# Patient Record
Sex: FEMALE | Employment: OTHER | URBAN - METROPOLITAN AREA
[De-identification: names, ages, dates, MRNs, and addresses within clinical notes are randomized per-mention and may not be internally consistent; named-entity substitution may affect disease eponyms.]

---

## 2023-10-17 ENCOUNTER — HOSPITAL ENCOUNTER (OUTPATIENT)
Dept: RADIOLOGY | Facility: HOSPITAL | Age: 73
Discharge: HOME | End: 2023-10-17
Payer: MEDICARE

## 2023-10-17 VITALS — HEIGHT: 64 IN | WEIGHT: 151 LBS | BODY MASS INDEX: 25.78 KG/M2

## 2023-10-17 DIAGNOSIS — N60.82 OTHER BENIGN MAMMARY DYSPLASIAS OF LEFT BREAST: ICD-10-CM

## 2023-10-17 PROCEDURE — 77066 DX MAMMO INCL CAD BI: CPT

## 2023-10-21 PROBLEM — S72.143A INTERTROCHANTERIC FRACTURE (MULTI): Status: ACTIVE | Noted: 2021-08-07

## 2023-10-21 PROBLEM — R06.00 DYSPNEA: Status: ACTIVE | Noted: 2023-10-21

## 2023-10-21 PROBLEM — M23.91 INTERNAL DERANGEMENT OF RIGHT KNEE: Status: ACTIVE | Noted: 2018-04-17

## 2023-10-21 PROBLEM — G89.29 CHRONIC PAIN OF RIGHT ANKLE: Status: ACTIVE | Noted: 2021-04-15

## 2023-10-21 PROBLEM — M99.05 SOMATIC DYSFUNCTION OF PELVIS REGION: Status: ACTIVE | Noted: 2021-04-15

## 2023-10-21 PROBLEM — G89.29 CHRONIC BILATERAL LOW BACK PAIN WITHOUT SCIATICA: Status: ACTIVE | Noted: 2021-04-15

## 2023-10-21 PROBLEM — N60.82 OTHER BENIGN MAMMARY DYSPLASIAS OF LEFT BREAST: Status: ACTIVE | Noted: 2023-10-21

## 2023-10-21 PROBLEM — M80.00XD AGE-RELATED OSTEOPOROSIS WITH CURRENT PATHOLOGICAL FRACTURE WITH ROUTINE HEALING: Status: ACTIVE | Noted: 2021-08-10

## 2023-10-21 PROBLEM — M99.02 THORACIC SEGMENT DYSFUNCTION: Status: ACTIVE | Noted: 2021-04-15

## 2023-10-21 PROBLEM — M99.04 SOMATIC DYSFUNCTION OF SACRAL REGION: Status: ACTIVE | Noted: 2021-04-15

## 2023-10-21 PROBLEM — E78.2 MIXED HYPERLIPIDEMIA: Status: ACTIVE | Noted: 2022-05-16

## 2023-10-21 PROBLEM — T88.4XXA HARD TO INTUBATE: Status: ACTIVE | Noted: 2021-08-09

## 2023-10-21 PROBLEM — M25.571 CHRONIC PAIN OF RIGHT ANKLE: Status: ACTIVE | Noted: 2021-04-15

## 2023-10-21 PROBLEM — S72.009A: Status: ACTIVE | Noted: 2021-08-07

## 2023-10-21 PROBLEM — M89.8X1 PAIN IN SCAPULA: Status: ACTIVE | Noted: 2021-04-15

## 2023-10-21 PROBLEM — M99.03 SOMATIC DYSFUNCTION OF LUMBAR REGION: Status: ACTIVE | Noted: 2021-04-15

## 2023-10-21 PROBLEM — R26.2 IMPAIRED AMBULATION: Status: ACTIVE | Noted: 2020-05-16

## 2023-10-21 PROBLEM — I25.10 CAD (CORONARY ARTERY DISEASE): Status: ACTIVE | Noted: 2021-08-07
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
## 2023-10-21 PROBLEM — M54.50 CHRONIC BILATERAL LOW BACK PAIN WITHOUT SCIATICA: Status: ACTIVE | Noted: 2021-04-15

## 2023-10-21 PROBLEM — M99.08 RIB CAGE REGION SOMATIC DYSFUNCTION: Status: ACTIVE | Noted: 2021-04-15

## 2023-10-21 PROBLEM — I10 PRIMARY HYPERTENSION: Status: ACTIVE | Noted: 2022-05-16

## 2023-10-21 PROBLEM — R60.0 BILATERAL LOWER EXTREMITY EDEMA: Status: ACTIVE | Noted: 2022-05-16

## 2023-10-21 RX ORDER — DIVALPROEX SODIUM 500 MG/1
500 TABLET, FILM COATED, EXTENDED RELEASE ORAL DAILY
COMMUNITY

## 2023-10-21 RX ORDER — ROSUVASTATIN CALCIUM 20 MG/1
1 TABLET, COATED ORAL NIGHTLY
COMMUNITY
Start: 2023-05-31 | End: 2024-03-28 | Stop reason: SINTOL

## 2023-10-21 RX ORDER — MONTELUKAST SODIUM 10 MG/1
10 TABLET ORAL EVERY EVENING
COMMUNITY
Start: 2015-02-17 | End: 2023-12-22 | Stop reason: ALTCHOICE

## 2023-10-21 RX ORDER — SPIRONOLACTONE 25 MG/1
25 TABLET ORAL DAILY
COMMUNITY
Start: 2021-11-23 | End: 2024-05-02 | Stop reason: SDUPTHER

## 2023-10-21 RX ORDER — LORATADINE 10 MG/1
10 TABLET ORAL DAILY PRN
COMMUNITY
Start: 2010-05-20

## 2023-10-21 RX ORDER — ROSUVASTATIN CALCIUM 10 MG/1
10 TABLET, COATED ORAL DAILY
COMMUNITY
End: 2023-12-22 | Stop reason: ALTCHOICE

## 2023-10-21 RX ORDER — LIDOCAINE 560 MG/1
PATCH PERCUTANEOUS; TOPICAL; TRANSDERMAL
COMMUNITY

## 2023-10-21 RX ORDER — FLUTICASONE PROPIONATE 50 MCG
1 SPRAY, SUSPENSION (ML) NASAL DAILY
COMMUNITY

## 2023-10-21 RX ORDER — NAPROXEN SODIUM 220 MG/1
81 TABLET, FILM COATED ORAL DAILY
COMMUNITY

## 2023-10-21 RX ORDER — ASCORBIC ACID 500 MG
1000 TABLET ORAL DAILY
COMMUNITY

## 2023-10-21 RX ORDER — ALBUTEROL SULFATE 90 UG/1
1-2 AEROSOL, METERED RESPIRATORY (INHALATION) EVERY 4 HOURS PRN
COMMUNITY
Start: 2012-03-16

## 2023-10-21 RX ORDER — DIVALPROEX SODIUM 500 MG/1
2 TABLET, FILM COATED, EXTENDED RELEASE ORAL NIGHTLY
COMMUNITY
Start: 2015-01-06 | End: 2023-12-22 | Stop reason: SDUPTHER

## 2023-10-21 RX ORDER — NAPROXEN 500 MG/1
500 TABLET ORAL 2 TIMES DAILY PRN
COMMUNITY
Start: 2023-03-20 | End: 2023-12-22 | Stop reason: ALTCHOICE

## 2023-10-24 ENCOUNTER — OFFICE VISIT (OUTPATIENT)
Dept: PRIMARY CARE | Facility: CLINIC | Age: 73
End: 2023-10-24
Payer: MEDICARE

## 2023-10-24 ENCOUNTER — APPOINTMENT (OUTPATIENT)
Dept: PRIMARY CARE | Facility: CLINIC | Age: 73
End: 2023-10-24
Payer: MEDICARE

## 2023-10-24 VITALS
RESPIRATION RATE: 16 BRPM | DIASTOLIC BLOOD PRESSURE: 72 MMHG | WEIGHT: 150 LBS | SYSTOLIC BLOOD PRESSURE: 132 MMHG | BODY MASS INDEX: 25.61 KG/M2 | HEIGHT: 64 IN | HEART RATE: 62 BPM

## 2023-10-24 DIAGNOSIS — I25.118 CORONARY ARTERY DISEASE INVOLVING NATIVE CORONARY ARTERY OF NATIVE HEART WITH OTHER FORM OF ANGINA PECTORIS (CMS-HCC): ICD-10-CM

## 2023-10-24 DIAGNOSIS — K76.0 NONALCOHOLIC HEPATOSTEATOSIS: ICD-10-CM

## 2023-10-24 DIAGNOSIS — E78.2 MIXED HYPERLIPIDEMIA: ICD-10-CM

## 2023-10-24 DIAGNOSIS — I10 PRIMARY HYPERTENSION: ICD-10-CM

## 2023-10-24 DIAGNOSIS — M79.89 LEG SWELLING: ICD-10-CM

## 2023-10-24 DIAGNOSIS — R10.12 LEFT UPPER QUADRANT PAIN: Primary | ICD-10-CM

## 2023-10-24 DIAGNOSIS — J45.909 MODERATE ASTHMA, UNSPECIFIED WHETHER COMPLICATED, UNSPECIFIED WHETHER PERSISTENT (HHS-HCC): ICD-10-CM

## 2023-10-24 DIAGNOSIS — I83.813 VARICOSE VEINS OF BILATERAL LOWER EXTREMITIES WITH PAIN: ICD-10-CM

## 2023-10-24 DIAGNOSIS — R26.89 LOSS OF BALANCE: ICD-10-CM

## 2023-10-24 DIAGNOSIS — R73.9 HYPERGLYCEMIA: ICD-10-CM

## 2023-10-24 DIAGNOSIS — Z00.00 ANNUAL PHYSICAL EXAM: ICD-10-CM

## 2023-10-24 DIAGNOSIS — E03.9 ACQUIRED HYPOTHYROIDISM: ICD-10-CM

## 2023-10-24 PROCEDURE — 99387 INIT PM E/M NEW PAT 65+ YRS: CPT | Performed by: INTERNAL MEDICINE

## 2023-10-24 PROCEDURE — 1036F TOBACCO NON-USER: CPT | Performed by: INTERNAL MEDICINE

## 2023-10-24 PROCEDURE — 1159F MED LIST DOCD IN RCRD: CPT | Performed by: INTERNAL MEDICINE

## 2023-10-24 PROCEDURE — 3075F SYST BP GE 130 - 139MM HG: CPT | Performed by: INTERNAL MEDICINE

## 2023-10-24 PROCEDURE — 99204 OFFICE O/P NEW MOD 45 MIN: CPT | Performed by: INTERNAL MEDICINE

## 2023-10-24 PROCEDURE — 3078F DIAST BP <80 MM HG: CPT | Performed by: INTERNAL MEDICINE

## 2023-10-24 ASSESSMENT — ENCOUNTER SYMPTOMS
PSYCHIATRIC NEGATIVE: 1
HEMATOLOGIC/LYMPHATIC NEGATIVE: 1
ALLERGIC/IMMUNOLOGIC NEGATIVE: 1
RESPIRATORY NEGATIVE: 1
LOSS OF SENSATION IN FEET: 0
MUSCULOSKELETAL NEGATIVE: 1
ENDOCRINE NEGATIVE: 1
ABDOMINAL PAIN: 1
DEPRESSION: 0
NEUROLOGICAL NEGATIVE: 1
EYES NEGATIVE: 1
CONSTITUTIONAL NEGATIVE: 1
OCCASIONAL FEELINGS OF UNSTEADINESS: 0

## 2023-10-24 NOTE — ASSESSMENT & PLAN NOTE
HTN - hypertension well/controlled .Target BP < 130/80  achieved. Educate low salt diet and exercise with weight loss. Educate home self monitoring and diary keeping. Educate risks of elevate blood pressure and benefits of prompt treatment.  Refill

## 2023-10-24 NOTE — ASSESSMENT & PLAN NOTE
CAD-coronary artery disease-patient has a history of myocardial infarction/stent placed in stenosed coronary arteries. Target LDL should be below 70 milligrams per deciliter. Reviewed EKG which shows no significant changes. Follows with his cardiologist/  ???He needs to call us with any new symptoms of angina or shortness of breath. Last stress test was/last coronary catheterization was/. Need to address risk factors BioCore controlling cholesterol blood pressure and diabetes. Educate extensively diet. Patient needs to follow in rehabilitation/mild exercises daily.

## 2023-10-24 NOTE — PROGRESS NOTES
"Subjective   Patient ID: Mckenna Hardin is a 73 y.o. female who presents for New Patient Visit (Left thigh pain).    HPI     Review of Systems   Constitutional: Negative.    HENT: Negative.     Eyes: Negative.    Respiratory: Negative.     Cardiovascular:  Positive for chest pain.   Gastrointestinal:  Positive for abdominal pain.   Endocrine: Negative.    Musculoskeletal: Negative.    Skin: Negative.    Allergic/Immunologic: Negative.    Neurological: Negative.    Hematological: Negative.    Psychiatric/Behavioral: Negative.     All other systems reviewed and are negative.      Objective   Pulse 62   Ht 1.626 m (5' 4\")   Wt 68 kg (150 lb)   BMI 25.75 kg/m²   Pulse 62, height 1.626 m (5' 4\"), weight 68 kg (150 lb).   Physical Exam  Vitals and nursing note reviewed.   Constitutional:       Appearance: Normal appearance.   HENT:      Head: Normocephalic and atraumatic.      Right Ear: Tympanic membrane, ear canal and external ear normal.      Left Ear: Tympanic membrane, ear canal and external ear normal. There is no impacted cerumen.      Nose: Nose normal.      Mouth/Throat:      Mouth: Mucous membranes are moist.      Pharynx: Oropharynx is clear.   Eyes:      Extraocular Movements: Extraocular movements intact.      Conjunctiva/sclera: Conjunctivae normal.      Pupils: Pupils are equal, round, and reactive to light.   Cardiovascular:      Rate and Rhythm: Normal rate and regular rhythm.      Pulses: Normal pulses.      Heart sounds: Normal heart sounds. No murmur heard.  Pulmonary:      Effort: Pulmonary effort is normal. No respiratory distress.      Breath sounds: Normal breath sounds. No stridor. No wheezing, rhonchi or rales.   Chest:      Chest wall: No tenderness.   Abdominal:      General: Abdomen is flat. Bowel sounds are normal. There is no distension.      Palpations: Abdomen is soft. There is no mass.      Tenderness: There is no abdominal tenderness. There is no right CVA tenderness, left CVA " tenderness, guarding or rebound.      Hernia: No hernia is present.   Musculoskeletal:         General: Normal range of motion.      Cervical back: Normal range of motion and neck supple.   Skin:     General: Skin is warm.      Capillary Refill: Capillary refill takes less than 2 seconds.   Neurological:      General: No focal deficit present.      Mental Status: She is alert.      Cranial Nerves: No cranial nerve deficit.      Sensory: No sensory deficit.      Motor: No weakness.      Coordination: Coordination normal.      Gait: Gait normal.      Deep Tendon Reflexes: Reflexes normal.   Psychiatric:         Mood and Affect: Mood normal.         Behavior: Behavior normal. Behavior is cooperative.         Thought Content: Thought content normal.         Judgment: Judgment normal.         Assessment/Plan   Problem List Items Addressed This Visit             ICD-10-CM    CAD (coronary artery disease) I25.10     CAD-coronary artery disease-patient has a history of myocardial infarction/stent placed in stenosed coronary arteries. Target LDL should be below 70 milligrams per deciliter. Reviewed EKG which shows no significant changes. Follows with his cardiologist/  ???He needs to call us with any new symptoms of angina or shortness of breath. Last stress test was/last coronary catheterization was/. Need to address risk factors BioCore controlling cholesterol blood pressure and diabetes. Educate extensively diet. Patient needs to follow in rehabilitation/mild exercises daily.           Relevant Orders    Comprehensive Metabolic Panel    Leg swelling M79.89     Edema - and leg swelling dur to venous insufficiency - responding to low dose Furosemide and recommend: leg elevation and compression stockings -           Mixed hyperlipidemia E78.2     Hypercholesterolemia - Monitor lipid profile and educate patient upon risks of high cholesterol and targets. Educate diet and change in lifestyle and increase in exercises -  Refill: rosuvastatin   and educate compliance with medication and diet.           Relevant Orders    Lipid Panel    Nonalcoholic hepatosteatosis K76.0     Monitor liver function and educate diet Educate extensively low carbohydrate diet AND LOW FAT DIET AND increase in exercise activity   \         Primary hypertension I10     HTN - hypertension well/controlled .Target BP < 130/80  achieved. Educate low salt diet and exercise with weight loss. Educate home self monitoring and diary keeping. Educate risks of elevate blood pressure and benefits of prompt treatment.  Refill           Relevant Orders    Comprehensive Metabolic Panel    Loss of balance R26.89     Loss of balance  - and recommend Physical therapy and strengthening exercises and address gait exercises          Left upper quadrant pain - Primary R10.12     New onset and persistent and recommend CT of the abdomen and pelvis and labs         Relevant Orders    CT abdomen pelvis wo IV contrast    Annual physical exam Z00.00    Varicose veins of bilateral lower extremities with pain I83.813    Hyperglycemia R73.9    Relevant Orders    Hemoglobin A1C    Acquired hypothyroidism E03.9    Relevant Orders    CBC and Auto Differential    TSH with reflex to Free T4 if abnormal

## 2023-10-24 NOTE — ASSESSMENT & PLAN NOTE
Loss of balance  - and recommend Physical therapy and strengthening exercises and address gait exercises

## 2023-10-24 NOTE — ASSESSMENT & PLAN NOTE
Monitor liver function and educate diet Educate extensively low carbohydrate diet AND LOW FAT DIET AND increase in exercise activity   \

## 2023-10-24 NOTE — ASSESSMENT & PLAN NOTE
Hypercholesterolemia - Monitor lipid profile and educate patient upon risks of high cholesterol and targets. Educate diet and change in lifestyle and increase in exercises - Refill: rosuvastatin   and educate compliance with medication and diet.

## 2023-11-08 ENCOUNTER — TELEPHONE (OUTPATIENT)
Dept: PRIMARY CARE | Facility: CLINIC | Age: 73
End: 2023-11-08
Payer: MEDICARE

## 2023-11-08 ENCOUNTER — APPOINTMENT (OUTPATIENT)
Dept: RADIOLOGY | Facility: HOSPITAL | Age: 73
End: 2023-11-08
Payer: MEDICARE

## 2023-11-09 NOTE — TELEPHONE ENCOUNTER
I called and spoke with radiology at [facility]. They suspect that the ct was cancelled yesterday because they have not received approval from insurance for the test yet. I have reached out to the precert department to see what is going on with the patient's approval. I called the patient to inform of the status, no answer, no way to leave a message.

## 2023-11-28 ENCOUNTER — HOSPITAL ENCOUNTER (OUTPATIENT)
Dept: RADIOLOGY | Facility: HOSPITAL | Age: 73
Discharge: HOME | End: 2023-11-28
Payer: MEDICARE

## 2023-11-28 DIAGNOSIS — R10.12 LEFT UPPER QUADRANT PAIN: ICD-10-CM

## 2023-11-28 PROCEDURE — 74176 CT ABD & PELVIS W/O CONTRAST: CPT

## 2023-12-05 ENCOUNTER — TELEPHONE (OUTPATIENT)
Dept: PRIMARY CARE | Facility: CLINIC | Age: 73
End: 2023-12-05
Payer: MEDICARE

## 2023-12-07 ENCOUNTER — OFFICE VISIT (OUTPATIENT)
Dept: PRIMARY CARE | Facility: CLINIC | Age: 73
End: 2023-12-07
Payer: MEDICARE

## 2023-12-07 VITALS
HEART RATE: 72 BPM | DIASTOLIC BLOOD PRESSURE: 70 MMHG | SYSTOLIC BLOOD PRESSURE: 120 MMHG | BODY MASS INDEX: 25.61 KG/M2 | RESPIRATION RATE: 16 BRPM | WEIGHT: 150 LBS | HEIGHT: 64 IN

## 2023-12-07 DIAGNOSIS — K74.69 OTHER CIRRHOSIS OF LIVER (MULTI): Primary | ICD-10-CM

## 2023-12-07 DIAGNOSIS — R73.9 HYPERGLYCEMIA: ICD-10-CM

## 2023-12-07 PROCEDURE — 1159F MED LIST DOCD IN RCRD: CPT | Performed by: INTERNAL MEDICINE

## 2023-12-07 PROCEDURE — 99214 OFFICE O/P EST MOD 30 MIN: CPT | Performed by: INTERNAL MEDICINE

## 2023-12-07 PROCEDURE — 3074F SYST BP LT 130 MM HG: CPT | Performed by: INTERNAL MEDICINE

## 2023-12-07 PROCEDURE — 3078F DIAST BP <80 MM HG: CPT | Performed by: INTERNAL MEDICINE

## 2023-12-07 PROCEDURE — 1036F TOBACCO NON-USER: CPT | Performed by: INTERNAL MEDICINE

## 2023-12-07 ASSESSMENT — ENCOUNTER SYMPTOMS
ENDOCRINE NEGATIVE: 1
GASTROINTESTINAL NEGATIVE: 1
RESPIRATORY NEGATIVE: 1
CONSTITUTIONAL NEGATIVE: 1
EYES NEGATIVE: 1
HEMATOLOGIC/LYMPHATIC NEGATIVE: 1
PSYCHIATRIC NEGATIVE: 1
NEUROLOGICAL NEGATIVE: 1
ALLERGIC/IMMUNOLOGIC NEGATIVE: 1
CARDIOVASCULAR NEGATIVE: 1
MUSCULOSKELETAL NEGATIVE: 1

## 2023-12-07 NOTE — ASSESSMENT & PLAN NOTE
Educate extensively low carbohydrate diet AND LOW FAT DIET AND increase in exercise activity  and weight loss program and monitor HbA1C and glucose levels and consider Metformin 500 mg BID with meals

## 2023-12-07 NOTE — ASSESSMENT & PLAN NOTE
Cirrhosis - of the liver - monitor liver function and monitor Viral hepatitis and no history of ETOH abuse and possible Depalote effect??? Refer to liver specialist /GI

## 2023-12-07 NOTE — PROGRESS NOTES
"Subjective   Patient ID: Mckenna Hardin is a 73 y.o. female who presents for Follow-up (Follow up on CT).    HPI     Review of Systems   Constitutional: Negative.    HENT: Negative.     Eyes: Negative.    Respiratory: Negative.     Cardiovascular: Negative.    Gastrointestinal: Negative.    Endocrine: Negative.    Musculoskeletal: Negative.    Skin: Negative.    Allergic/Immunologic: Negative.    Neurological: Negative.    Hematological: Negative.    Psychiatric/Behavioral: Negative.     All other systems reviewed and are negative.      Objective   Ht 1.626 m (5' 4\")   Wt 68 kg (150 lb)   BMI 25.75 kg/m²   Blood pressure 120/70, pulse 72, resp. rate 16, height 1.626 m (5' 4\"), weight 68 kg (150 lb).   Physical Exam  Vitals and nursing note reviewed.   Constitutional:       Appearance: Normal appearance.   HENT:      Head: Normocephalic and atraumatic.      Right Ear: Tympanic membrane, ear canal and external ear normal.      Left Ear: Tympanic membrane, ear canal and external ear normal. There is no impacted cerumen.      Nose: Nose normal.      Mouth/Throat:      Mouth: Mucous membranes are moist.      Pharynx: Oropharynx is clear.   Eyes:      Extraocular Movements: Extraocular movements intact.      Conjunctiva/sclera: Conjunctivae normal.      Pupils: Pupils are equal, round, and reactive to light.   Cardiovascular:      Rate and Rhythm: Normal rate and regular rhythm.      Pulses: Normal pulses.      Heart sounds: Normal heart sounds. No murmur heard.  Pulmonary:      Effort: Pulmonary effort is normal. No respiratory distress.      Breath sounds: Normal breath sounds. No stridor. No wheezing, rhonchi or rales.   Chest:      Chest wall: No tenderness.   Abdominal:      General: Abdomen is flat. Bowel sounds are normal. There is no distension.      Palpations: Abdomen is soft. There is no mass.      Tenderness: There is no abdominal tenderness. There is no right CVA tenderness, left CVA tenderness, guarding or " rebound.      Hernia: No hernia is present.   Musculoskeletal:         General: Normal range of motion.      Cervical back: Normal range of motion and neck supple.   Skin:     General: Skin is warm.      Capillary Refill: Capillary refill takes less than 2 seconds.   Neurological:      General: No focal deficit present.      Mental Status: She is alert.      Cranial Nerves: No cranial nerve deficit.      Sensory: No sensory deficit.      Motor: No weakness.      Coordination: Coordination normal.      Gait: Gait normal.      Deep Tendon Reflexes: Reflexes normal.   Psychiatric:         Mood and Affect: Mood normal.         Behavior: Behavior normal. Behavior is cooperative.         Thought Content: Thought content normal.         Judgment: Judgment normal.         Assessment/Plan   Problem List Items Addressed This Visit             ICD-10-CM    Hyperglycemia R73.9     Educate extensively low carbohydrate diet AND LOW FAT DIET AND increase in exercise activity  and weight loss program and monitor HbA1C and glucose levels and consider Metformin 500 mg BID with meals             Other cirrhosis of liver (CMS/HCC) - Primary K74.69     Cirrhosis - of the liver - monitor liver function and monitor Viral hepatitis and no history of ETOH abuse and possible Depalote effect??? Refer to liver specialist /GI          Relevant Orders    Hepatitis C antibody    Hepatitis B core antibody, total       CAD (coronary artery disease) I25.10        CAD-coronary artery disease-patient has a history of myocardial infarction/stent placed in stenosed coronary arteries. Target LDL should be below 70 milligrams per deciliter. Reviewed EKG which shows no significant changes. Follows with his cardiologist/  ???He needs to call us with any new symptoms of angina or shortness of breath. Last stress test was/last coronary catheterization was/. Need to address risk factors BioCore controlling cholesterol blood pressure and diabetes. Educate  extensively diet. Patient needs to follow in rehabilitation/mild exercises daily.              Relevant Orders     Comprehensive Metabolic Panel     Leg swelling M79.89       Edema - and leg swelling dur to venous insufficiency - responding to low dose Furosemide and recommend: leg elevation and compression stockings -              Mixed hyperlipidemia E78.2       Hypercholesterolemia - Monitor lipid profile and educate patient upon risks of high cholesterol and targets. Educate diet and change in lifestyle and increase in exercises - Refill: rosuvastatin   and educate compliance with medication and diet.             Relevant Orders     Lipid Panel     Nonalcoholic hepatosteatosis K76.0       Monitor liver function and educate diet Educate extensively low carbohydrate diet AND LOW FAT DIET AND increase in exercise activity   \           Primary hypertension I10       HTN - hypertension well/controlled .Target BP < 130/80  achieved. Educate low salt diet and exercise with weight loss. Educate home self monitoring and diary keeping. Educate risks of elevate blood pressure and benefits of prompt treatment.  Refill              Relevant Orders     Comprehensive Metabolic Panel     Loss of balance R26.89       Loss of balance  - and recommend Physical therapy and strengthening exercises and address gait exercises            Left upper quadrant pain - Primary R10.12       New onset and persistent and recommend CT of the abdomen and pelvis and labs           Relevant Orders     CT abdomen pelvis wo IV contrast     Annual physical exam Z00.00     Varicose veins of bilateral lower extremities with pain I83.813     Hyperglycemia R73.9     Relevant Orders     Hemoglobin A1C     Acquired hypothyroidism E03.9     Relevant Orders     CBC and Auto Differential     TSH with reflex to Free T4 if abnormal

## 2023-12-11 ENCOUNTER — LAB (OUTPATIENT)
Dept: LAB | Facility: LAB | Age: 73
End: 2023-12-11
Payer: MEDICARE

## 2023-12-11 DIAGNOSIS — E03.9 ACQUIRED HYPOTHYROIDISM: ICD-10-CM

## 2023-12-11 DIAGNOSIS — E78.2 MIXED HYPERLIPIDEMIA: ICD-10-CM

## 2023-12-11 DIAGNOSIS — I10 PRIMARY HYPERTENSION: ICD-10-CM

## 2023-12-11 DIAGNOSIS — I25.118 CORONARY ARTERY DISEASE INVOLVING NATIVE CORONARY ARTERY OF NATIVE HEART WITH OTHER FORM OF ANGINA PECTORIS (CMS-HCC): ICD-10-CM

## 2023-12-11 DIAGNOSIS — R73.9 HYPERGLYCEMIA: ICD-10-CM

## 2023-12-11 DIAGNOSIS — K74.69 OTHER CIRRHOSIS OF LIVER (MULTI): ICD-10-CM

## 2023-12-11 LAB
ALBUMIN SERPL BCP-MCNC: 4.4 G/DL (ref 3.4–5)
ALP SERPL-CCNC: 139 U/L (ref 33–136)
ALT SERPL W P-5'-P-CCNC: 26 U/L (ref 7–45)
ANION GAP SERPL CALC-SCNC: 14 MMOL/L (ref 10–20)
AST SERPL W P-5'-P-CCNC: 37 U/L (ref 9–39)
BASOPHILS # BLD AUTO: 0.03 X10*3/UL (ref 0–0.1)
BASOPHILS NFR BLD AUTO: 0.5 %
BILIRUB SERPL-MCNC: 0.8 MG/DL (ref 0–1.2)
BUN SERPL-MCNC: 12 MG/DL (ref 6–23)
CALCIUM SERPL-MCNC: 10.3 MG/DL (ref 8.6–10.6)
CHLORIDE SERPL-SCNC: 106 MMOL/L (ref 98–107)
CHOLEST SERPL-MCNC: 242 MG/DL (ref 0–199)
CHOLESTEROL/HDL RATIO: 4
CO2 SERPL-SCNC: 27 MMOL/L (ref 21–32)
CREAT SERPL-MCNC: 0.74 MG/DL (ref 0.5–1.05)
EOSINOPHIL # BLD AUTO: 0.02 X10*3/UL (ref 0–0.4)
EOSINOPHIL NFR BLD AUTO: 0.3 %
ERYTHROCYTE [DISTWIDTH] IN BLOOD BY AUTOMATED COUNT: 12.4 % (ref 11.5–14.5)
EST. AVERAGE GLUCOSE BLD GHB EST-MCNC: 128 MG/DL
GFR SERPL CREATININE-BSD FRML MDRD: 87 ML/MIN/1.73M*2
GLUCOSE SERPL-MCNC: 104 MG/DL (ref 74–99)
HBA1C MFR BLD: 6.1 %
HBV CORE AB SER QL: NONREACTIVE
HCT VFR BLD AUTO: 48 % (ref 36–46)
HCV AB SER QL: NONREACTIVE
HDLC SERPL-MCNC: 60.1 MG/DL
HGB BLD-MCNC: 15.7 G/DL (ref 12–16)
IMM GRANULOCYTES # BLD AUTO: 0.01 X10*3/UL (ref 0–0.5)
IMM GRANULOCYTES NFR BLD AUTO: 0.2 % (ref 0–0.9)
LDLC SERPL CALC-MCNC: 145 MG/DL
LYMPHOCYTES # BLD AUTO: 3.4 X10*3/UL (ref 0.8–3)
LYMPHOCYTES NFR BLD AUTO: 52.6 %
MCH RBC QN AUTO: 32 PG (ref 26–34)
MCHC RBC AUTO-ENTMCNC: 32.7 G/DL (ref 32–36)
MCV RBC AUTO: 98 FL (ref 80–100)
MONOCYTES # BLD AUTO: 1.02 X10*3/UL (ref 0.05–0.8)
MONOCYTES NFR BLD AUTO: 15.8 %
NEUTROPHILS # BLD AUTO: 1.99 X10*3/UL (ref 1.6–5.5)
NEUTROPHILS NFR BLD AUTO: 30.6 %
NON HDL CHOLESTEROL: 182 MG/DL (ref 0–149)
NRBC BLD-RTO: 0 /100 WBCS (ref 0–0)
PLATELET # BLD AUTO: 171 X10*3/UL (ref 150–450)
POTASSIUM SERPL-SCNC: 4.2 MMOL/L (ref 3.5–5.3)
PROT SERPL-MCNC: 7.1 G/DL (ref 6.4–8.2)
RBC # BLD AUTO: 4.91 X10*6/UL (ref 4–5.2)
SODIUM SERPL-SCNC: 143 MMOL/L (ref 136–145)
T4 FREE SERPL-MCNC: 0.9 NG/DL (ref 0.78–1.48)
TRIGL SERPL-MCNC: 183 MG/DL (ref 0–149)
TSH SERPL-ACNC: 5.9 MIU/L (ref 0.44–3.98)
VLDL: 37 MG/DL (ref 0–40)
WBC # BLD AUTO: 6.5 X10*3/UL (ref 4.4–11.3)

## 2023-12-11 PROCEDURE — 83036 HEMOGLOBIN GLYCOSYLATED A1C: CPT

## 2023-12-11 PROCEDURE — 85025 COMPLETE CBC W/AUTO DIFF WBC: CPT

## 2023-12-11 PROCEDURE — 86803 HEPATITIS C AB TEST: CPT

## 2023-12-11 PROCEDURE — 36415 COLL VENOUS BLD VENIPUNCTURE: CPT

## 2023-12-11 PROCEDURE — 84439 ASSAY OF FREE THYROXINE: CPT

## 2023-12-11 PROCEDURE — 86704 HEP B CORE ANTIBODY TOTAL: CPT

## 2023-12-11 PROCEDURE — 80053 COMPREHEN METABOLIC PANEL: CPT

## 2023-12-11 PROCEDURE — 80061 LIPID PANEL: CPT

## 2023-12-11 PROCEDURE — 84443 ASSAY THYROID STIM HORMONE: CPT

## 2023-12-13 DIAGNOSIS — Z79.899 PHARMACOLOGIC THERAPY: Primary | ICD-10-CM

## 2023-12-14 ENCOUNTER — APPOINTMENT (OUTPATIENT)
Dept: PRIMARY CARE | Facility: CLINIC | Age: 73
End: 2023-12-14
Payer: MEDICARE

## 2023-12-15 ENCOUNTER — APPOINTMENT (OUTPATIENT)
Dept: GASTROENTEROLOGY | Facility: CLINIC | Age: 73
End: 2023-12-15
Payer: MEDICARE

## 2023-12-22 ENCOUNTER — LAB (OUTPATIENT)
Dept: LAB | Facility: LAB | Age: 73
End: 2023-12-22
Payer: MEDICARE

## 2023-12-22 ENCOUNTER — OFFICE VISIT (OUTPATIENT)
Dept: GASTROENTEROLOGY | Facility: CLINIC | Age: 73
End: 2023-12-22
Payer: MEDICARE

## 2023-12-22 VITALS
SYSTOLIC BLOOD PRESSURE: 165 MMHG | DIASTOLIC BLOOD PRESSURE: 72 MMHG | HEIGHT: 64 IN | BODY MASS INDEX: 25.27 KG/M2 | TEMPERATURE: 98.1 F | WEIGHT: 148 LBS | HEART RATE: 65 BPM

## 2023-12-22 DIAGNOSIS — Z79.899 PHARMACOLOGIC THERAPY: ICD-10-CM

## 2023-12-22 DIAGNOSIS — K76.0 NON-ALCOHOLIC FATTY LIVER DISEASE: Primary | ICD-10-CM

## 2023-12-22 DIAGNOSIS — K74.60 CIRRHOSIS OF LIVER WITHOUT ASCITES, UNSPECIFIED HEPATIC CIRRHOSIS TYPE (MULTI): ICD-10-CM

## 2023-12-22 DIAGNOSIS — K76.0 NON-ALCOHOLIC FATTY LIVER DISEASE: ICD-10-CM

## 2023-12-22 LAB
AFP SERPL-MCNC: 5 NG/ML (ref 0–9)
CERULOPLASMIN SERPL-MCNC: 31.1 MG/DL (ref 20–60)
FERRITIN SERPL-MCNC: 430 NG/ML (ref 8–150)
HAV AB SER QL IA: REACTIVE
HBV SURFACE AB SER-ACNC: <3.1 MIU/ML
HBV SURFACE AG SERPL QL IA: NONREACTIVE
INR PPP: 1 (ref 0.9–1.1)
IRON SATN MFR SERPL: 32 % (ref 25–45)
IRON SERPL-MCNC: 132 UG/DL (ref 35–150)
PROTHROMBIN TIME: 11.1 SECONDS (ref 9.8–12.8)
TIBC SERPL-MCNC: 413 UG/DL (ref 240–445)
UIBC SERPL-MCNC: 281 UG/DL (ref 110–370)

## 2023-12-22 PROCEDURE — 36415 COLL VENOUS BLD VENIPUNCTURE: CPT

## 2023-12-22 PROCEDURE — 1159F MED LIST DOCD IN RCRD: CPT | Performed by: INTERNAL MEDICINE

## 2023-12-22 PROCEDURE — 3078F DIAST BP <80 MM HG: CPT | Performed by: INTERNAL MEDICINE

## 2023-12-22 PROCEDURE — 85610 PROTHROMBIN TIME: CPT

## 2023-12-22 PROCEDURE — 86038 ANTINUCLEAR ANTIBODIES: CPT

## 2023-12-22 PROCEDURE — 87340 HEPATITIS B SURFACE AG IA: CPT

## 2023-12-22 PROCEDURE — 82390 ASSAY OF CERULOPLASMIN: CPT

## 2023-12-22 PROCEDURE — 86708 HEPATITIS A ANTIBODY: CPT

## 2023-12-22 PROCEDURE — 3077F SYST BP >= 140 MM HG: CPT | Performed by: INTERNAL MEDICINE

## 2023-12-22 PROCEDURE — 83540 ASSAY OF IRON: CPT

## 2023-12-22 PROCEDURE — 86381 MITOCHONDRIAL ANTIBODY EACH: CPT

## 2023-12-22 PROCEDURE — 82104 ALPHA-1-ANTITRYPSIN PHENO: CPT

## 2023-12-22 PROCEDURE — 99214 OFFICE O/P EST MOD 30 MIN: CPT | Performed by: INTERNAL MEDICINE

## 2023-12-22 PROCEDURE — 99204 OFFICE O/P NEW MOD 45 MIN: CPT | Performed by: INTERNAL MEDICINE

## 2023-12-22 PROCEDURE — 80165 DIPROPYLACETIC ACID FREE: CPT

## 2023-12-22 PROCEDURE — 86706 HEP B SURFACE ANTIBODY: CPT

## 2023-12-22 PROCEDURE — 82728 ASSAY OF FERRITIN: CPT

## 2023-12-22 PROCEDURE — 83550 IRON BINDING TEST: CPT

## 2023-12-22 PROCEDURE — 82105 ALPHA-FETOPROTEIN SERUM: CPT

## 2023-12-22 PROCEDURE — 86015 ACTIN ANTIBODY EACH: CPT

## 2023-12-22 PROCEDURE — 1036F TOBACCO NON-USER: CPT | Performed by: INTERNAL MEDICINE

## 2023-12-22 NOTE — PROGRESS NOTES
HEPATOLOGY NEW PATIENT VISIT    December 22, 2023    Dr. Yogesh Martines       Patient Name:   ANGELIQUE HARDIN  Medical Record Number: [id number]  YOB: 1950    Dear Dr. Martines,    I had the pleasure of seeing Angelique Hardin for consultation in the [facility] ([facility] office). History and physical examination was performed. Pertinent available laboratory, imaging, pathology results were reviewed.     The patient has been followed for many years at [facility].  She has apparently now decided to come to  for her medical care.    History of Present Illness:   The patient is a 73 year old white female who is referred for evaluation of fatty liver disease and concern for cirrhosis.    She established with a new  provider.  Imaging studies suggested possible cirrhosis and she was referred to me for further evaluation.    She has apparently been diagnosed in the past with fatty liver disease.  It is somewhat difficult to get an accurate history from her.  She does not recall what the previous [facility] hepatologist told her.  She is not even sure that they told her that she had fatty liver disease.  She has never had a liver biopsy.  She reports that she did start taking milk thistle on her own.    She has also seen a [facility] hematologist in the past for high blood counts.  They had apparently recommended phlebotomy but she opted not to do it.  I specifically asked whether she had been diagnosed with iron overload and she does not recall that.      The patient has risk factors for fatty liver disease including hyperlipidemia and elevated hemoglobin A1c.  She has been on a statin.  She had a heart attack around 2013 and after that lost 40 pounds and has been able to maintain her weight since then.    The patient denied any past history of acute hepatitis or jaundice.      She has never had any manifestations of liver disease including no jaundice, ascites, hepatic encephalopathy, or variceal  bleeding. She denied ever having a liver biopsy.    She presented today for evaluation.  She denied any specific liver-related complaints.     Past Medical/Surgical History:   COVID  Age-related osteoporosis with current pathological fracture with routine healing  Asthma  Bilateral lower extremity edema  Bipolar I disorder (CMS/HCC)  CAD (coronary artery disease)  Chronic bilateral low back pain without sciatica  Chronic pain of right ankle  Dupuytren's disease of palm  Dyspnea  Fall  Hard to intubate  Hip fx (CMS/HCC)  Impaired ambulation  Internal derangement of right knee  Intertrochanteric fracture (CMS/HCC)  Leg swelling  LFTs abnormal  Lymphedema  Mixed hyperlipidemia  Nevus  Nonalcoholic hepatosteatosis  NSTEMI (non-ST elevated myocardial infarction) (CMS/HCC)  Obesity  Other benign mammary dysplasias of left breast  Pain in scapula  Personal history of other malignant neoplasm of skin  Primary hypertension  Somatic dysfunction of sacral region  Rib cage region somatic dysfunction  Somatic dysfunction of lumbar region  Somatic dysfunction of pelvis region  Thoracic segment dysfunction  Loss of balance  Left upper quadrant pain  Annual physical exam  Varicose veins of bilateral lower extremities with pain  Hyperglycemia  Acquired hypothyroidism  Other cirrhosis of liver (CMS/HCC)      Family History:    Hypertension Mother      Stroke Father      Hypertension Father      Breast Cancer Sister 53         Lef tmastectomy    Diabetes Sister 48         same as breast ca    Heart Brother 57         sudden cardiac death    Diabetes Brother 52         sudden death    Diabetes Paternal Grandmother      Genetic Paternal Grandfather      GI Daughter      Headache Son      Hearing Loss Child      Heart Maternal Aunt      other (cirrhosis [Other]) Paternal Aunt           ?diabete bronze    Stroke Paternal Aunt      Stroke Paternal Aunt       There is no known family history of colon cancer.  Even though the reported family  history shows a paternal aunt with cirrhosis, the patient herself does not recall this.  There is no known family history of hemochromatosis or iron overload that she is aware of.    Social History:   She is a former smoker.  She occasionally drinks alcohol.  She denied transfusions.  She denied tattoos.  She denied intravenous drug use.  She is a Oriental orthodox.  She is .    Review of systems: As noted above.  She has been having issues with significant dizziness.  It is not related to position changes.  She sometimes uses a walker because of the dizziness and unsteady gait.  She has had issues with constipation but no GI bleeding.  No fever, chills, weight loss, visual changes, auditory changes, shortness of breath, chest pain, abdominal pain, GI bleeding, diarrhea, depression, dysuria, hematuria, musculoskeletal issues, or rash.    Medical, Surgical, Family, and Social Histories  Past Medical History:   Diagnosis Date    Atypical ductal hyperplasia, breast     left breast       Past Surgical History:   Procedure Laterality Date    BREAST BIOPSY Left        Family History   Problem Relation Name Age of Onset    Breast cancer Sister         Social History     Socioeconomic History    Marital status:      Spouse name: Not on file    Number of children: Not on file    Years of education: Not on file    Highest education level: Not on file   Occupational History    Not on file   Tobacco Use    Smoking status: Former     Types: Cigarettes     Quit date:      Years since quittin.9    Smokeless tobacco: Never   Substance and Sexual Activity    Alcohol use: Yes     Alcohol/week: 1.0 standard drink of alcohol     Types: 1 Glasses of wine per week     Comment: Social    Drug use: Never    Sexual activity: Not on file   Other Topics Concern    Not on file   Social History Narrative    Not on file     Social Determinants of Health     Financial Resource Strain: Not on file   Food Insecurity: Not on  "file   Transportation Needs: Not on file   Physical Activity: Not on file   Stress: Not on file   Social Connections: Not on file   Intimate Partner Violence: Not on file   Housing Stability: Not on file       Allergies and Current Medications  Allergies   Allergen Reactions    Codeine Unknown     Unable to recall sideffect    Haloperidol Unknown     hyper    Thiazides Rash     Current Outpatient Medications   Medication Sig Dispense Refill    albuterol 90 mcg/actuation inhaler Inhale 1-2 puffs every 4 hours if needed for wheezing.      ascorbic acid (Vitamin C) 500 mg tablet Take 1 tablet (500 mg) by mouth once daily. For 30 days      aspirin 81 mg chewable tablet Chew 1 tablet (81 mg) once daily. For 30 days      divalproex (Depakote ER) 500 mg 24 hr tablet Take 1 tablet (500 mg) by mouth once daily. For 30 days      fluticasone (Flonase) 50 mcg/actuation nasal spray Administer 1 spray into each nostril once daily.      inhalational spacing device inhaler Use as directed with inhalers 1 each 0    lidocaine 4 % patch As directed externally      loratadine (Claritin) 10 mg tablet Take 1 tablet (10 mg) by mouth once daily as needed.      rosuvastatin (Crestor) 20 mg tablet Take 1 tablet (20 mg) by mouth once daily at bedtime.      spironolactone (Aldactone) 25 mg tablet Take 1 tablet (25 mg) by mouth once daily.       No current facility-administered medications for this visit.        Physical Exam  /72 (BP Location: Right arm, Patient Position: Sitting, BP Cuff Size: Adult)   Pulse 65   Temp 36.7 °C (98.1 °F)   Ht 1.626 m (5' 4\")   Wt 67.1 kg (148 lb)   BMI 25.40 kg/m²       Physical Examination:   General Appearance: alert and in no acute distress.   HEENT: oropharynx without lesions. Anicteric sclerae.   Neck supple, nontender, without adenopathy, thyromegaly, or JVD.   Lungs clear to auscultation and percussion.   Heart RRR without murmurs, rubs, or gallops.   Abdomen: Soft, nontender, bowel sounds " positive, without obvious ascites. Liver and spleen not palpable.   Extremities full ROM, no atrophy, normal strength. No edema.   Skin no specific lesions.   Neurological exam nonfocal, alert and oriented.  Blunted affect.  No asterixis.  No spider angiomata, or palmar erythema.     Labs 12/11/2023 HBcAb -, HCV -, HgbA1c 6.1%, TSH 5.9, WBC 6.5, hemoglobin 15.7, platelets 171, glucose 104, sodium 143, creatinine 0.74, protein 7.1, albumin 4.4, alk phos 139, bilirubin 0.8, AST 37, ALT 26, cholesterol 242, , triglycerides 183.    CT without contrast 11/28/2023:  IMPRESSION:  Nodular contour of the liver suggests cirrhosis.          Assessment/Plan:     Fatty liver disease  Concern for cirrhosis    The patient is referred to me for evaluation of fatty liver disease and concern for cirrhosis.    We did speak about fatty liver disease. I explained that the risk factors include diabetes, obesity, hyperlipidemia and alcohol use. I explained that she needs to work on diet, weight loss and exercise.  She also needs to work aggressively with the primary provider about hyperlipidemia and diabetes.  I did explain that 20-25% of patients with NOWAK may proceed to cirrhosis.    Working on the same risk factors could decrease her future risk of heart disease and stroke.    She has been on Depakote for many years for bipolar which could contribute to liver disease as well.    The concern is whether she has cirrhosis based on this latest CAT scan.  Keep in mind that this is a noncontrast CAT scan which is not optimal for surveying the liver.      We will do a standard ultrasound of the liver.      I will stage her liver disease with a FibroSCAN test.  Depending on that result, we can decide whether liver biopsy is warranted.    I will check for immunity against hepatitis A and hepatitis B.  If not immune, then vaccines would be warranted.    With this supposed long history of known liver disease, I will look for other  etiologies of liver disease.  To complete the serologic work-up to evaluate for any viral, autoimmune, or metabolic causes of liver disease, and to evaluate for immunity to hepatitis A and B, I will send off the following lab tests today:   [Antinuclear antibody (JACQUIE)]  [Antimitochondrial antibody (AMA)]  [Smooth muscle antibody (SMA)]  [Ceruloplasmin]  [Iron panel]  [Ferritin]  [Alpha-1-antitrypsin phenotype]  [Hepatitis A total antibody]  [Hepatitis B surface antigen]  [Hepatitis B surface antibody]  [Alpha-fetoprotein (AFP)]    She does have subjective complaints including severe dizziness.  I did recommend that she contact her PCP, or even consider going to urgent care/emergency room if this worsens.  She apparently does have polycythemia and could have hyperviscosity syndrome or other issues.  Obviously, she could have any of the other more common causes of dizziness as well.    She does have a long history of constipation which she reports has gotten worse more recently.  I recommended a colonoscopy.  She is not interested.  I did recommend that she have further discussions with her PCP about this.    Thank you for allowing me to participate in the care of this patient. Please feel free to contact me with any questions regarding their care.     Sincerely,     Jose Martel MD, FAASLD, FACG.   Medical Director, Hepatology  Senior Attending Physician  [facility]  [facility]  Professor of Medicine  Division of Gastroenterology and Liver Disease  [employer]  [address] [place] [id number]  Phone: [phone]  Fax: [phone].      This document was generated with a computerized dictation system.  Because of this, there could be errors in grammar and/or content.

## 2023-12-22 NOTE — PATIENT INSTRUCTIONS
Get lab tests.    Get a standard ultrasound of the liver.    Get a FibroScan ultrasound of the liver.    Speak to your primary care doctor about the dizziness.    Also, speak to your PCP about whether you need a colonoscopy.

## 2023-12-26 LAB
A1AT PHENOTYP SERPL-IMP: NORMAL
A1AT SERPL-MCNC: 105 MG/DL (ref 90–200)
ANA PATTERN: ABNORMAL
ANA SER QL HEP2 SUBST: POSITIVE
ANA TITR SER IF: ABNORMAL {TITER}
MITOCHONDRIA AB SER QL IF: NEGATIVE
SMOOTH MUSCLE AB SER QL IF: NEGATIVE

## 2023-12-27 LAB
SCAN RESULT: NORMAL
VALPROATE FREE SERPL-MCNC: 6.9 UG/ML (ref 4–30)

## 2023-12-28 ENCOUNTER — APPOINTMENT (OUTPATIENT)
Dept: RADIOLOGY | Facility: HOSPITAL | Age: 73
End: 2023-12-28
Payer: MEDICARE

## 2023-12-29 ENCOUNTER — HOSPITAL ENCOUNTER (OUTPATIENT)
Dept: RADIOLOGY | Facility: HOSPITAL | Age: 73
Discharge: HOME | End: 2023-12-29
Payer: MEDICARE

## 2023-12-29 DIAGNOSIS — K74.60 CIRRHOSIS OF LIVER WITHOUT ASCITES, UNSPECIFIED HEPATIC CIRRHOSIS TYPE (MULTI): ICD-10-CM

## 2023-12-29 DIAGNOSIS — K76.0 NON-ALCOHOLIC FATTY LIVER DISEASE: ICD-10-CM

## 2023-12-29 PROCEDURE — 76705 ECHO EXAM OF ABDOMEN: CPT

## 2024-01-10 ENCOUNTER — CLINICAL SUPPORT (OUTPATIENT)
Dept: GASTROENTEROLOGY | Facility: CLINIC | Age: 74
End: 2024-01-10
Payer: MEDICARE

## 2024-01-10 DIAGNOSIS — K74.60 CIRRHOSIS OF LIVER WITHOUT ASCITES, UNSPECIFIED HEPATIC CIRRHOSIS TYPE (MULTI): ICD-10-CM

## 2024-01-10 DIAGNOSIS — K76.0 NON-ALCOHOLIC FATTY LIVER DISEASE: ICD-10-CM

## 2024-01-10 PROCEDURE — 91200 LIVER ELASTOGRAPHY: CPT | Performed by: INTERNAL MEDICINE

## 2024-01-15 ENCOUNTER — TELEPHONE (OUTPATIENT)
Dept: GASTROENTEROLOGY | Facility: CLINIC | Age: 74
End: 2024-01-15
Payer: MEDICARE

## 2024-01-15 NOTE — TELEPHONE ENCOUNTER
Called patient to discuss results and plan per Dr Martel: patient will decide if she wants to proceed with biopsy and call back with decision.     HEPATOLOGY ATTENDING NOTE     I personally reviewed and interpreted the FibroSCAN results.     It revealed a median liver stiffness of 16.5 kPa with an IQR of 12% and   .     This is consistent with stage 4 disease or cirrhosis and moderate   steatosis.     She needs to get a liver biopsy and RTC after the biopsy is done.     LELAND Martel.

## 2024-01-25 ENCOUNTER — TELEPHONE (OUTPATIENT)
Dept: GASTROENTEROLOGY | Facility: CLINIC | Age: 74
End: 2024-01-25
Payer: MEDICARE

## 2024-01-25 DIAGNOSIS — K74.60 CIRRHOSIS OF LIVER WITHOUT ASCITES, UNSPECIFIED HEPATIC CIRRHOSIS TYPE (MULTI): ICD-10-CM

## 2024-01-30 ENCOUNTER — OFFICE VISIT (OUTPATIENT)
Dept: PRIMARY CARE | Facility: CLINIC | Age: 74
End: 2024-01-30
Payer: MEDICARE

## 2024-01-30 ENCOUNTER — TELEPHONE (OUTPATIENT)
Dept: PRIMARY CARE | Facility: CLINIC | Age: 74
End: 2024-01-30

## 2024-01-30 VITALS
HEIGHT: 66 IN | OXYGEN SATURATION: 98 % | RESPIRATION RATE: 16 BRPM | SYSTOLIC BLOOD PRESSURE: 140 MMHG | DIASTOLIC BLOOD PRESSURE: 80 MMHG | BODY MASS INDEX: 23.46 KG/M2 | HEART RATE: 62 BPM | WEIGHT: 146 LBS

## 2024-01-30 DIAGNOSIS — Z00.00 MEDICARE ANNUAL WELLNESS VISIT, SUBSEQUENT: Primary | ICD-10-CM

## 2024-01-30 DIAGNOSIS — K74.69 OTHER CIRRHOSIS OF LIVER (MULTI): ICD-10-CM

## 2024-01-30 DIAGNOSIS — R60.0 BILATERAL LOWER EXTREMITY EDEMA: ICD-10-CM

## 2024-01-30 DIAGNOSIS — R73.01 ELEVATED FASTING GLUCOSE: ICD-10-CM

## 2024-01-30 DIAGNOSIS — I83.813 VARICOSE VEINS OF BILATERAL LOWER EXTREMITIES WITH PAIN: ICD-10-CM

## 2024-01-30 DIAGNOSIS — E03.9 ACQUIRED HYPOTHYROIDISM: ICD-10-CM

## 2024-01-30 DIAGNOSIS — R26.89 LOSS OF BALANCE: ICD-10-CM

## 2024-01-30 DIAGNOSIS — I25.118 CORONARY ARTERY DISEASE INVOLVING NATIVE CORONARY ARTERY OF NATIVE HEART WITH OTHER FORM OF ANGINA PECTORIS (CMS-HCC): ICD-10-CM

## 2024-01-30 DIAGNOSIS — I10 PRIMARY HYPERTENSION: ICD-10-CM

## 2024-01-30 DIAGNOSIS — K76.0 NONALCOHOLIC HEPATOSTEATOSIS: ICD-10-CM

## 2024-01-30 DIAGNOSIS — Z12.11 COLON CANCER SCREENING: ICD-10-CM

## 2024-01-30 DIAGNOSIS — I21.4 NSTEMI (NON-ST ELEVATED MYOCARDIAL INFARCTION) (MULTI): ICD-10-CM

## 2024-01-30 PROCEDURE — 3077F SYST BP >= 140 MM HG: CPT | Performed by: INTERNAL MEDICINE

## 2024-01-30 PROCEDURE — G0439 PPPS, SUBSEQ VISIT: HCPCS | Performed by: INTERNAL MEDICINE

## 2024-01-30 PROCEDURE — 99214 OFFICE O/P EST MOD 30 MIN: CPT | Performed by: INTERNAL MEDICINE

## 2024-01-30 PROCEDURE — 3079F DIAST BP 80-89 MM HG: CPT | Performed by: INTERNAL MEDICINE

## 2024-01-30 PROCEDURE — 1036F TOBACCO NON-USER: CPT | Performed by: INTERNAL MEDICINE

## 2024-01-30 PROCEDURE — 1159F MED LIST DOCD IN RCRD: CPT | Performed by: INTERNAL MEDICINE

## 2024-01-30 RX ORDER — CALCIUM CARBONATE 260MG(650)
TABLET,CHEWABLE ORAL
COMMUNITY

## 2024-01-30 RX ORDER — ASCORBIC ACID 1000 MG
175 TABLET ORAL DAILY
COMMUNITY

## 2024-01-30 RX ORDER — DEXTROSE 4 G
1 TABLET,CHEWABLE ORAL DAILY
Qty: 1 EACH | Refills: 0 | Status: SHIPPED | OUTPATIENT
Start: 2024-01-30

## 2024-01-30 RX ORDER — CHOLECALCIFEROL (VITAMIN D3) 25 MCG
1000 TABLET ORAL DAILY
COMMUNITY

## 2024-01-30 RX ORDER — LANCETS
1 EACH MISCELLANEOUS DAILY
Qty: 100 EACH | Refills: 0 | Status: SHIPPED | OUTPATIENT
Start: 2024-01-30 | End: 2024-02-02 | Stop reason: WASHOUT

## 2024-01-30 RX ORDER — BLOOD SUGAR DIAGNOSTIC
1 STRIP MISCELLANEOUS DAILY
Qty: 100 STRIP | Refills: 0 | Status: SHIPPED | OUTPATIENT
Start: 2024-01-30 | End: 2024-04-29

## 2024-01-30 RX ORDER — LEVOTHYROXINE SODIUM 25 UG/1
25 TABLET ORAL DAILY
Qty: 30 TABLET | Refills: 11 | Status: SHIPPED | OUTPATIENT
Start: 2024-01-30 | End: 2024-05-02 | Stop reason: SDUPTHER

## 2024-01-30 RX ORDER — MILK THISTLE 150 MG
CAPSULE ORAL
COMMUNITY

## 2024-01-30 ASSESSMENT — ENCOUNTER SYMPTOMS
ALLERGIC/IMMUNOLOGIC NEGATIVE: 1
MUSCULOSKELETAL NEGATIVE: 1
EYES NEGATIVE: 1
HEMATOLOGIC/LYMPHATIC NEGATIVE: 1
ENDOCRINE NEGATIVE: 1
PSYCHIATRIC NEGATIVE: 1
NEUROLOGICAL NEGATIVE: 1
RESPIRATORY NEGATIVE: 1
CONSTITUTIONAL NEGATIVE: 1
GASTROINTESTINAL NEGATIVE: 1
CARDIOVASCULAR NEGATIVE: 1

## 2024-01-30 NOTE — ASSESSMENT & PLAN NOTE
Hypothyroidism - Symptoms well/controlled (weight gain, fatigue, constipation, cold intolerance). Check TSH  start low  dose of Synthroid/Levothyroxine  of  25 mcg/qD.

## 2024-01-30 NOTE — ASSESSMENT & PLAN NOTE
No recent hospitalizations.    All medications reviewed and reconciled by me the provider..  No use of controlled substances or opiates.    Family history, social history reviewed, no changes.    Patient does not smoke.    Patient does not drink.    Patient hydrates adequately daily.  Eats a well-balanced healthy diet.     Exercises adequately including walking and doing weightbearing exercises.    Patient denies any difficulty with memory or cognition.     Denies any difficulty with hearing.  Patient does not wear hearing aids.    No fall risk.  No recent falls.  Denies any difficulty walking.    Patient with no history of depression anxiety, denies any loss of interest, no feeling of sadness, no lack of motivation.    Patient is independent in all ADLs and IADLs.  Independent bathing, dressing, walking.  Takes care of own finances, shopping and cooking.     End-of-life decision-making power of  reviewed with patient.      Risk Factors Identified During Visit: None.   Influenza: influenza vaccine was previously given.   Pneumovax 23: Pneumovax 23 vaccine was previously given.   Prevnar 13: Prevnar 13 vaccine was previously given.   Shingles Vaccine: Shingles vaccine was previously given.   Colorectal Cancer Screening: screening is current.   Abdominal Aortic Aneurysm screening: screening is current.   HIV screening: screening not indicated.         Preventive measures - Recommend ASAP : PAP/Mamogram and refer patient to GYN. Specialist. Colonoscopy (educate patient risks of colon cancer) refer patient to GI specialist. Ophthalmology and retina exam recommend yearly exams refer patient to an OphthalmologistBPH - (Benign Prostatic Hypertrophy) refer patient to an Urologist for rectal exam and PSA check.

## 2024-01-30 NOTE — PROGRESS NOTES
"Subjective   Patient ID: Mckenna Hardin is a 73 y.o. female who presents for Follow-up (Follow Up).    HPI     Review of Systems   Constitutional: Negative.    HENT: Negative.     Eyes: Negative.    Respiratory: Negative.     Cardiovascular: Negative.    Gastrointestinal: Negative.    Endocrine: Negative.    Musculoskeletal: Negative.    Skin: Negative.    Allergic/Immunologic: Negative.    Neurological: Negative.    Hematological: Negative.    Psychiatric/Behavioral: Negative.     All other systems reviewed and are negative.      Objective   /70   Pulse 62   Ht 1.676 m (5' 6\")   Wt 66.2 kg (146 lb)   SpO2 98%   BMI 23.57 kg/m²   Blood pressure 140/80, pulse 62, resp. rate 16, height 1.676 m (5' 6\"), weight 66.2 kg (146 lb), SpO2 98 %.   Physical Exam  Vitals and nursing note reviewed.   Constitutional:       Appearance: Normal appearance.   HENT:      Head: Normocephalic and atraumatic.      Right Ear: Tympanic membrane, ear canal and external ear normal.      Left Ear: Tympanic membrane, ear canal and external ear normal. There is no impacted cerumen.      Nose: Nose normal.      Mouth/Throat:      Mouth: Mucous membranes are moist.      Pharynx: Oropharynx is clear.   Eyes:      Extraocular Movements: Extraocular movements intact.      Conjunctiva/sclera: Conjunctivae normal.      Pupils: Pupils are equal, round, and reactive to light.   Cardiovascular:      Rate and Rhythm: Normal rate and regular rhythm.      Pulses: Normal pulses.      Heart sounds: Normal heart sounds. No murmur heard.  Pulmonary:      Effort: Pulmonary effort is normal. No respiratory distress.      Breath sounds: Normal breath sounds. No stridor. No wheezing, rhonchi or rales.   Chest:      Chest wall: No tenderness.   Abdominal:      General: Abdomen is flat. Bowel sounds are normal. There is no distension.      Palpations: Abdomen is soft. There is no mass.      Tenderness: There is no abdominal tenderness. There is no right CVA " tenderness, left CVA tenderness, guarding or rebound.      Hernia: No hernia is present.   Musculoskeletal:         General: Normal range of motion.      Cervical back: Normal range of motion and neck supple.   Skin:     General: Skin is warm.      Capillary Refill: Capillary refill takes less than 2 seconds.   Neurological:      General: No focal deficit present.      Mental Status: She is alert.      Cranial Nerves: No cranial nerve deficit.      Sensory: No sensory deficit.      Motor: No weakness.      Coordination: Coordination normal.      Gait: Gait normal.      Deep Tendon Reflexes: Reflexes normal.   Psychiatric:         Mood and Affect: Mood normal.         Behavior: Behavior normal. Behavior is cooperative.         Thought Content: Thought content normal.         Judgment: Judgment normal.         Assessment/Plan   Problem List Items Addressed This Visit             ICD-10-CM    Bilateral lower extremity edema R60.0    CAD (coronary artery disease) I25.10    Nonalcoholic hepatosteatosis K76.0    NSTEMI (non-ST elevated myocardial infarction) (CMS/[facility]) I21.4    Primary hypertension I10    Loss of balance R26.89    Medicare annual wellness visit, subsequent - Primary Z00.00     No recent hospitalizations.    All medications reviewed and reconciled by me the provider..  No use of controlled substances or opiates.    Family history, social history reviewed, no changes.    Patient does not smoke.    Patient does not drink.    Patient hydrates adequately daily.  Eats a well-balanced healthy diet.     Exercises adequately including walking and doing weightbearing exercises.    Patient denies any difficulty with memory or cognition.     Denies any difficulty with hearing.  Patient does not wear hearing aids.    No fall risk.  No recent falls.  Denies any difficulty walking.    Patient with no history of depression anxiety, denies any loss of interest, no feeling of sadness, no lack of motivation.    Patient is  independent in all ADLs and IADLs.  Independent bathing, dressing, walking.  Takes care of own finances, shopping and cooking.     End-of-life decision-making power of  reviewed with patient.      Risk Factors Identified During Visit: None.   Influenza: influenza vaccine was previously given.   Pneumovax 23: Pneumovax 23 vaccine was previously given.   Prevnar 13: Prevnar 13 vaccine was previously given.   Shingles Vaccine: Shingles vaccine was previously given.   Colorectal Cancer Screening: screening is current.   Abdominal Aortic Aneurysm screening: screening is current.   HIV screening: screening not indicated.       Preventive measures - Recommend ASAP : PAP/Mamogram and refer patient to GYN. Specialist. Colonoscopy (educate patient risks of colon cancer) refer patient to GI specialist. Ophthalmology and retina exam recommend yearly exams refer patient to an OphthalmologistBPH - (Benign Prostatic Hypertrophy) refer patient to an Urologist for rectal exam and PSA check.          Varicose veins of bilateral lower extremities with pain I83.813    Acquired hypothyroidism E03.9     Hypothyroidism - Symptoms well/controlled (weight gain, fatigue, constipation, cold intolerance). Check TSH  start low  dose of Synthroid/Levothyroxine  of  25 mcg/qD.          Relevant Medications    levothyroxine (Synthroid, Levoxyl) 25 mcg tablet    Other cirrhosis of liver (CMS/HCC) K74.69       Hyperglycemia R73.9        Educate extensively low carbohydrate diet AND LOW FAT DIET AND increase in exercise activity  and weight loss program and monitor HbA1C and glucose levels and consider Metformin 500 mg BID with meals                Other cirrhosis of liver (CMS/HCC) - Primary K74.69       Cirrhosis - of the liver - monitor liver function and monitor Viral hepatitis and no history of ETOH abuse and possible Depalote effect??? Refer to liver specialist /GI            Relevant Orders     Hepatitis C antibody     Hepatitis B core  antibody, total                CAD (coronary artery disease) I25.10          CAD-coronary artery disease-patient has a history of myocardial infarction/stent placed in stenosed coronary arteries. Target LDL should be below 70 milligrams per deciliter. Reviewed EKG which shows no significant changes. Follows with his cardiologist/  ???He needs to call us with any new symptoms of angina or shortness of breath. Last stress test was/last coronary catheterization was/. Need to address risk factors BioCore controlling cholesterol blood pressure and diabetes. Educate extensively diet. Patient needs to follow in rehabilitation/mild exercises daily.              Relevant Orders      Comprehensive Metabolic Panel      Leg swelling M79.89        Edema - and leg swelling dur to venous insufficiency - responding to low dose Furosemide and recommend: leg elevation and compression stockings -              Mixed hyperlipidemia E78.2        Hypercholesterolemia - Monitor lipid profile and educate patient upon risks of high cholesterol and targets. Educate diet and change in lifestyle and increase in exercises - Refill: rosuvastatin   and educate compliance with medication and diet.             Relevant Orders      Lipid Panel      Nonalcoholic hepatosteatosis K76.0        Monitor liver function and educate diet Educate extensively low carbohydrate diet AND LOW FAT DIET AND increase in exercise activity   \           Primary hypertension I10        HTN - hypertension well/controlled .Target BP < 130/80  achieved. Educate low salt diet and exercise with weight loss. Educate home self monitoring and diary keeping. Educate risks of elevate blood pressure and benefits of prompt treatment.  Refill              Relevant Orders      Comprehensive Metabolic Panel      Loss of balance R26.89        Loss of balance  - and recommend Physical therapy and strengthening exercises and address gait exercises            Left upper quadrant pain -  Primary R10.12        New onset and persistent and recommend CT of the abdomen and pelvis and labs           Relevant Orders      CT abdomen pelvis wo IV contrast      Annual physical exam Z00.00      Varicose veins of bilateral lower extremities with pain I83.813      Hyperglycemia R73.9      Relevant Orders      Hemoglobin A1C      Acquired hypothyroidism E03.9      Relevant Orders      CBC and Auto Differential      TSH with reflex to Free T4 if abnormal                                       Immunizations/Injections     Flu vaccine (IIV4), preservative free *Check age/dose*12/7/2015  Flu vaccine, quadrivalent, high-dose, preservative free, age 65y+ (FLUZONE)10/5/2022, 10/5/2021, 9/21/2020  Influenza, High Dose Seasonal, Preservative Free8/31/2017  Influenza, Seasonal, Quadrivalent, Viydpvjead87/2/2023  Influenza, Fdommdtdkdd83/12/2012, 10/23/2011  Influenza, seasonal, wapmboddxg39/15/2014, 11/2/2013  Influenza, seasonal, injectable, preservative free9/28/2016  Influenza, trivalent, adjuvanted9/6/2019, 10/3/2018  Moderna COVID-19 vaccine, bivalent, blue cap/gray label *Check age/dose*9/9/2022  Moderna SARS-CoV-2 Vaccination4/11/2022, 11/1/2021, 3/12/2021,  ... (1 more)  Pfizer COVID-19 vaccine, Fall 2023, 12 years and older, (30mcg/0.3mL)10/2/2023  Pneumococcal conjugate vaccine, 13-valent (PREVNAR 13)10/3/2018  Pneumococcal polysaccharide vaccine, 23-valent, age 2 years and older (PNEUMOVAX 23)10/7/2019, 4/7/2010  Tdap vaccine, age 7 year and older (BOOSTRIX, ADACEL)4/7/2010

## 2024-01-31 ENCOUNTER — TELEPHONE (OUTPATIENT)
Dept: PRIMARY CARE | Facility: CLINIC | Age: 74
End: 2024-01-31
Payer: MEDICARE

## 2024-01-31 NOTE — TELEPHONE ENCOUNTER
Per gs-he reviewed pt's blood work with her yesterday at the appointment and blood work from December showed pt to have hypothyroidism. That is why he started Levothyroxin and pt was informed.

## 2024-01-31 NOTE — TELEPHONE ENCOUNTER
Spoke with patient and informed of the TSH levels and why medication was sent to the pharmacy. Pt understands and is informed.

## 2024-02-02 ENCOUNTER — OFFICE VISIT (OUTPATIENT)
Dept: HEMATOLOGY/ONCOLOGY | Facility: CLINIC | Age: 74
End: 2024-02-02
Payer: MEDICARE

## 2024-02-02 VITALS
HEIGHT: 63 IN | RESPIRATION RATE: 18 BRPM | SYSTOLIC BLOOD PRESSURE: 178 MMHG | WEIGHT: 146.72 LBS | DIASTOLIC BLOOD PRESSURE: 84 MMHG | TEMPERATURE: 96.3 F | BODY MASS INDEX: 26 KG/M2 | HEART RATE: 68 BPM | OXYGEN SATURATION: 97 %

## 2024-02-02 DIAGNOSIS — D50.0 IRON DEFICIENCY ANEMIA DUE TO CHRONIC BLOOD LOSS: Primary | ICD-10-CM

## 2024-02-02 LAB
ALBUMIN SERPL BCP-MCNC: 4.6 G/DL (ref 3.4–5)
ALP SERPL-CCNC: 173 U/L (ref 33–136)
ALT SERPL W P-5'-P-CCNC: 40 U/L (ref 7–45)
ANION GAP SERPL CALC-SCNC: 14 MMOL/L (ref 10–20)
AST SERPL W P-5'-P-CCNC: 36 U/L (ref 9–39)
BASOPHILS # BLD AUTO: 0.03 X10*3/UL (ref 0–0.1)
BASOPHILS NFR BLD AUTO: 0.4 %
BILIRUB SERPL-MCNC: 0.7 MG/DL (ref 0–1.2)
BUN SERPL-MCNC: 16 MG/DL (ref 6–23)
CALCIUM SERPL-MCNC: 10 MG/DL (ref 8.6–10.3)
CHLORIDE SERPL-SCNC: 101 MMOL/L (ref 98–107)
CO2 SERPL-SCNC: 28 MMOL/L (ref 21–32)
CREAT SERPL-MCNC: 0.74 MG/DL (ref 0.5–1.05)
EGFRCR SERPLBLD CKD-EPI 2021: 87 ML/MIN/1.73M*2
EOSINOPHIL # BLD AUTO: 0.01 X10*3/UL (ref 0–0.4)
EOSINOPHIL NFR BLD AUTO: 0.1 %
ERYTHROCYTE [DISTWIDTH] IN BLOOD BY AUTOMATED COUNT: 12.1 % (ref 11.5–14.5)
FERRITIN SERPL-MCNC: 383 NG/ML (ref 8–150)
GLUCOSE SERPL-MCNC: 158 MG/DL (ref 74–99)
HCT VFR BLD AUTO: 46.3 % (ref 36–46)
HGB BLD-MCNC: 15.6 G/DL (ref 12–16)
IMM GRANULOCYTES # BLD AUTO: 0.01 X10*3/UL (ref 0–0.5)
IMM GRANULOCYTES NFR BLD AUTO: 0.1 % (ref 0–0.9)
IRON SATN MFR SERPL: 30 % (ref 25–45)
IRON SERPL-MCNC: 135 UG/DL (ref 35–150)
LDH SERPL L TO P-CCNC: 151 U/L (ref 84–246)
LYMPHOCYTES # BLD AUTO: 3.6 X10*3/UL (ref 0.8–3)
LYMPHOCYTES NFR BLD AUTO: 50.8 %
MCH RBC QN AUTO: 31.4 PG (ref 26–34)
MCHC RBC AUTO-ENTMCNC: 33.7 G/DL (ref 32–36)
MCV RBC AUTO: 93 FL (ref 80–100)
MONOCYTES # BLD AUTO: 0.67 X10*3/UL (ref 0.05–0.8)
MONOCYTES NFR BLD AUTO: 9.5 %
NEUTROPHILS # BLD AUTO: 2.76 X10*3/UL (ref 1.6–5.5)
NEUTROPHILS NFR BLD AUTO: 39.1 %
NRBC BLD-RTO: 0 /100 WBCS (ref 0–0)
PLATELET # BLD AUTO: 196 X10*3/UL (ref 150–450)
POTASSIUM SERPL-SCNC: 3.8 MMOL/L (ref 3.5–5.3)
PROT SERPL-MCNC: 7.7 G/DL (ref 6.4–8.2)
RBC # BLD AUTO: 4.97 X10*6/UL (ref 4–5.2)
SODIUM SERPL-SCNC: 139 MMOL/L (ref 136–145)
TIBC SERPL-MCNC: 457 UG/DL (ref 240–445)
UIBC SERPL-MCNC: 322 UG/DL (ref 110–370)
WBC # BLD AUTO: 7.1 X10*3/UL (ref 4.4–11.3)

## 2024-02-02 PROCEDURE — 83615 LACTATE (LD) (LDH) ENZYME: CPT | Performed by: INTERNAL MEDICINE

## 2024-02-02 PROCEDURE — 1125F AMNT PAIN NOTED PAIN PRSNT: CPT | Performed by: INTERNAL MEDICINE

## 2024-02-02 PROCEDURE — 1159F MED LIST DOCD IN RCRD: CPT | Performed by: INTERNAL MEDICINE

## 2024-02-02 PROCEDURE — 83540 ASSAY OF IRON: CPT | Performed by: INTERNAL MEDICINE

## 2024-02-02 PROCEDURE — 3079F DIAST BP 80-89 MM HG: CPT | Performed by: INTERNAL MEDICINE

## 2024-02-02 PROCEDURE — 85025 COMPLETE CBC W/AUTO DIFF WBC: CPT | Performed by: INTERNAL MEDICINE

## 2024-02-02 PROCEDURE — 82728 ASSAY OF FERRITIN: CPT | Performed by: INTERNAL MEDICINE

## 2024-02-02 PROCEDURE — 99204 OFFICE O/P NEW MOD 45 MIN: CPT | Performed by: INTERNAL MEDICINE

## 2024-02-02 PROCEDURE — 1036F TOBACCO NON-USER: CPT | Performed by: INTERNAL MEDICINE

## 2024-02-02 PROCEDURE — 3077F SYST BP >= 140 MM HG: CPT | Performed by: INTERNAL MEDICINE

## 2024-02-02 PROCEDURE — 80053 COMPREHEN METABOLIC PANEL: CPT | Performed by: INTERNAL MEDICINE

## 2024-02-02 PROCEDURE — 99214 OFFICE O/P EST MOD 30 MIN: CPT | Performed by: INTERNAL MEDICINE

## 2024-02-02 RX ORDER — LANCETS 33 GAUGE
EACH MISCELLANEOUS
COMMUNITY
Start: 2024-01-30

## 2024-02-02 RX ORDER — VITAMIN B COMPLEX
1 CAPSULE ORAL DAILY
COMMUNITY

## 2024-02-02 ASSESSMENT — PATIENT HEALTH QUESTIONNAIRE - PHQ9
SUM OF ALL RESPONSES TO PHQ9 QUESTIONS 1 AND 2: 0
1. LITTLE INTEREST OR PLEASURE IN DOING THINGS: NOT AT ALL
2. FEELING DOWN, DEPRESSED OR HOPELESS: NOT AT ALL

## 2024-02-02 ASSESSMENT — ENCOUNTER SYMPTOMS
LOSS OF SENSATION IN FEET: 0
BLOOD IN STOOL: 0
SHORTNESS OF BREATH: 0
DEPRESSION: 0
HOT FLASHES: 0
HEMATURIA: 0
BRUISES/BLEEDS EASILY: 0
FATIGUE: 1
OCCASIONAL FEELINGS OF UNSTEADINESS: 1
SLEEP DISTURBANCE: 0
LEG SWELLING: 1
EYE PROBLEMS: 1
BACK PAIN: 1

## 2024-02-02 ASSESSMENT — COLUMBIA-SUICIDE SEVERITY RATING SCALE - C-SSRS
6. HAVE YOU EVER DONE ANYTHING, STARTED TO DO ANYTHING, OR PREPARED TO DO ANYTHING TO END YOUR LIFE?: NO
1. IN THE PAST MONTH, HAVE YOU WISHED YOU WERE DEAD OR WISHED YOU COULD GO TO SLEEP AND NOT WAKE UP?: NO
2. HAVE YOU ACTUALLY HAD ANY THOUGHTS OF KILLING YOURSELF?: NO

## 2024-02-02 ASSESSMENT — PAIN SCALES - GENERAL: PAINLEVEL: 4

## 2024-02-02 NOTE — PATIENT INSTRUCTIONS
Follow up with Dr. Hurtado in 6 months.     Please feel free to call with any questions or concerns at [phone].

## 2024-02-02 NOTE — PROGRESS NOTES
Patient is being seen today for initial consultation and referred by Dr. Martines, her PCP, for iron overload. She was recently referred to hepatology. She has had multiple scans and they would like to do a liver biopsy.    Limited mobility from hip pain.     Medications reviewed.     Labs drawn today. Follow up in 6 months.

## 2024-02-02 NOTE — PROGRESS NOTES
"Patient ID: Mckenna Hardin is a 73 y.o. female.  Referring Physician: No referring provider defined for this encounter.  Primary Care Provider: Yogesh Martines MD      Subjective    HPI  73-year-old woman with a history of bilateral lower extremity edema, coronary artery disease, with two stents, NSTEMI, nonalcoholic hepatosteatosis, primary hypertension, loss of balance, hypothyroidism, hyperglycemia, varicose veins, elevated alkaline phosphatase,bipolar disorder, referred for lymphocytosis.      Review of Systems   Constitutional:  Positive for fatigue.   HENT:   Positive for hearing loss. Negative for tinnitus.    Eyes:  Positive for eye problems.   Respiratory:  Negative for shortness of breath.    Cardiovascular:  Positive for leg swelling.   Gastrointestinal:  Negative for blood in stool.   Endocrine: Negative for hot flashes.   Genitourinary:  Negative for hematuria.    Musculoskeletal:  Positive for back pain and gait problem.   Skin:  Positive for itching.   Neurological:  Positive for gait problem.   Hematological:  Does not bruise/bleed easily.   Psychiatric/Behavioral:  Negative for depression and sleep disturbance.         Objective   BSA: 1.72 meters squared  /84 (BP Location: Right arm, Patient Position: Sitting, BP Cuff Size: Adult long)   Pulse 68   Temp 35.7 °C (96.3 °F) (Temporal)   Resp 18   Ht 1.597 m (5' 2.87\")   Wt 66.6 kg (146 lb 11.5 oz)   SpO2 97%   BMI 26.10 kg/m²     Family History   Problem Relation Name Age of Onset    Breast cancer Sister       Oncology History    No history exists.       Mckenna Hardin  reports that she quit smoking about 34 years ago. Her smoking use included cigarettes. She has never been exposed to tobacco smoke. She has never used smokeless tobacco.  She  reports that she does not currently use alcohol after a past usage of about 1.0 standard drink of alcohol per week.  She  reports no history of drug use.    Physical Exam  Constitutional:       " Appearance: She is obese.   HENT:      Head: Normocephalic.      Right Ear: External ear normal.      Left Ear: External ear normal.      Nose: Nose normal.      Mouth/Throat:      Mouth: Mucous membranes are moist.      Pharynx: Oropharynx is clear.   Eyes:      Extraocular Movements: Extraocular movements intact.      Pupils: Pupils are equal, round, and reactive to light.   Cardiovascular:      Rate and Rhythm: Normal rate and regular rhythm.      Pulses: Normal pulses.      Heart sounds: No murmur heard.     No friction rub.   Pulmonary:      Effort: Pulmonary effort is normal.      Breath sounds: Normal breath sounds.   Abdominal:      General: Abdomen is flat. Bowel sounds are normal.   Musculoskeletal:         General: Swelling present. Normal range of motion.      Cervical back: Normal range of motion and neck supple.   Skin:     General: Skin is warm.      Coloration: Skin is not jaundiced.   Neurological:      General: No focal deficit present.      Mental Status: She is alert and oriented to person, place, and time.   Psychiatric:         Mood and Affect: Mood normal.         Behavior: Behavior normal.           Assessment/Plan    Summary: 73-year-old woman with a history of bilateral lower extremity edema, coronary artery disease, with two stents, NSTEMI, nonalcoholic hepatosteatosis, primary hypertension, loss of balance, hypothyroidism, hyperglycemia, varicose veins, elevated alkaline phosphatase,bipolar disorder, referred for lymphocytosis.    Lymphocytosis, with monocytosis  12/11/2023: Lymphocytes 3.4K per microliter; monocytes 1.02K per microliter, hematocrit 48%, MCV 98  JACQUIE +1:160  Monocytes 0.95, lymphocytes normal, 5/5/2020  Lymphocytes 4.37, 6/1/2020  Discussion: Patient referred for leukocytosis but her white count is actually normal.  The differential shows a lymphocyte count greater than the upper limit of normal but less than 5000.  She may have a pre-CLL clone  PLAN:  CBC, flow cytometry,  SPEP, LDH  Otherwise return to clinic in 1 month to review the results    Cirrhosis  History of depakote 27 years  CT abdomen pelvis, 11/28/2023 with nodular liver contour  Splenule present  Child Sheets A5, life expectancy 15-20 years  146 lbs  Age 20, 105lbs  FH: aunt with cirrhosis  HBV and HCV negative 12-29-23  Followup with hepatology       Ferritin 430, 12/22/2023  32% iron saturation with TIBC 413  PLAN:   ESR  RTC 3 months    Calin Hurtado MD

## 2024-02-05 ENCOUNTER — DOCUMENTATION (OUTPATIENT)
Dept: HEMATOLOGY/ONCOLOGY | Facility: CLINIC | Age: 74
End: 2024-02-05
Payer: MEDICARE

## 2024-02-05 NOTE — PROGRESS NOTES
Spoke with patient about her labs  TIBC is high, suggesting iron deficiency  Ferritin is high, suggesting iron excess  The patient herself is stable.  No pagophagia.  I gave instructions to the patient to call us if change in symptoms, especially if she has excessive tiredness.

## 2024-02-08 ENCOUNTER — APPOINTMENT (OUTPATIENT)
Dept: HEMATOLOGY/ONCOLOGY | Facility: CLINIC | Age: 74
End: 2024-02-08
Payer: MEDICARE

## 2024-02-12 DIAGNOSIS — Z12.11 COLON CANCER SCREENING: ICD-10-CM

## 2024-02-12 RX ORDER — POLYETHYLENE GLYCOL 3350, SODIUM SULFATE ANHYDROUS, SODIUM BICARBONATE, SODIUM CHLORIDE, POTASSIUM CHLORIDE 236; 22.74; 6.74; 5.86; 2.97 G/4L; G/4L; G/4L; G/4L; G/4L
4000 POWDER, FOR SOLUTION ORAL ONCE
Qty: 4000 ML | Refills: 0 | Status: SHIPPED | OUTPATIENT
Start: 2024-02-12 | End: 2024-02-12

## 2024-02-13 ENCOUNTER — TELEPHONE (OUTPATIENT)
Dept: PRIMARY CARE | Facility: CLINIC | Age: 74
End: 2024-02-13
Payer: MEDICARE

## 2024-02-21 ENCOUNTER — APPOINTMENT (OUTPATIENT)
Dept: GASTROENTEROLOGY | Facility: EXTERNAL LOCATION | Age: 74
End: 2024-02-21
Payer: MEDICARE

## 2024-03-04 ENCOUNTER — TELEPHONE (OUTPATIENT)
Dept: PRIMARY CARE | Facility: CLINIC | Age: 74
End: 2024-03-04
Payer: MEDICARE

## 2024-03-05 DIAGNOSIS — Z12.11 COLON CANCER SCREENING: Primary | ICD-10-CM

## 2024-03-05 RX ORDER — POLYETHYLENE GLYCOL 3350, SODIUM SULFATE ANHYDROUS, SODIUM BICARBONATE, SODIUM CHLORIDE, POTASSIUM CHLORIDE 236; 22.74; 6.74; 5.86; 2.97 G/4L; G/4L; G/4L; G/4L; G/4L
4000 POWDER, FOR SOLUTION ORAL ONCE
Qty: 4000 ML | Refills: 0 | Status: SHIPPED | OUTPATIENT
Start: 2024-03-05 | End: 2024-03-05

## 2024-03-07 ENCOUNTER — APPOINTMENT (OUTPATIENT)
Dept: GASTROENTEROLOGY | Facility: EXTERNAL LOCATION | Age: 74
End: 2024-03-07
Payer: MEDICARE

## 2024-03-19 ENCOUNTER — APPOINTMENT (OUTPATIENT)
Dept: GASTROENTEROLOGY | Facility: CLINIC | Age: 74
End: 2024-03-19
Payer: MEDICARE

## 2024-03-27 ENCOUNTER — TELEPHONE (OUTPATIENT)
Dept: PRIMARY CARE | Facility: CLINIC | Age: 74
End: 2024-03-27
Payer: MEDICARE

## 2024-03-28 ENCOUNTER — OFFICE VISIT (OUTPATIENT)
Dept: PRIMARY CARE | Facility: CLINIC | Age: 74
End: 2024-03-28
Payer: MEDICARE

## 2024-03-28 VITALS
RESPIRATION RATE: 16 BRPM | WEIGHT: 147 LBS | HEART RATE: 72 BPM | SYSTOLIC BLOOD PRESSURE: 120 MMHG | DIASTOLIC BLOOD PRESSURE: 70 MMHG | HEIGHT: 62 IN | BODY MASS INDEX: 27.05 KG/M2

## 2024-03-28 DIAGNOSIS — E03.9 ACQUIRED HYPOTHYROIDISM: ICD-10-CM

## 2024-03-28 DIAGNOSIS — F31.9 BIPOLAR I DISORDER (MULTI): ICD-10-CM

## 2024-03-28 DIAGNOSIS — R60.0 BILATERAL LOWER EXTREMITY EDEMA: ICD-10-CM

## 2024-03-28 DIAGNOSIS — R73.9 HYPERGLYCEMIA: ICD-10-CM

## 2024-03-28 DIAGNOSIS — I10 PRIMARY HYPERTENSION: ICD-10-CM

## 2024-03-28 DIAGNOSIS — J01.00 ACUTE NON-RECURRENT MAXILLARY SINUSITIS: ICD-10-CM

## 2024-03-28 DIAGNOSIS — I25.118 CORONARY ARTERY DISEASE INVOLVING NATIVE CORONARY ARTERY OF NATIVE HEART WITH OTHER FORM OF ANGINA PECTORIS (CMS-HCC): ICD-10-CM

## 2024-03-28 DIAGNOSIS — E78.00 HYPERCHOLESTEROLEMIA: Primary | ICD-10-CM

## 2024-03-28 PROCEDURE — 99214 OFFICE O/P EST MOD 30 MIN: CPT | Performed by: INTERNAL MEDICINE

## 2024-03-28 PROCEDURE — 1036F TOBACCO NON-USER: CPT | Performed by: INTERNAL MEDICINE

## 2024-03-28 PROCEDURE — 3078F DIAST BP <80 MM HG: CPT | Performed by: INTERNAL MEDICINE

## 2024-03-28 PROCEDURE — 1159F MED LIST DOCD IN RCRD: CPT | Performed by: INTERNAL MEDICINE

## 2024-03-28 PROCEDURE — 3074F SYST BP LT 130 MM HG: CPT | Performed by: INTERNAL MEDICINE

## 2024-03-28 RX ORDER — LEVOTHYROXINE SODIUM 25 UG/1
25 TABLET ORAL DAILY
Qty: 30 TABLET | Refills: 2 | Status: SHIPPED | OUTPATIENT
Start: 2024-03-28 | End: 2024-04-23

## 2024-03-28 RX ORDER — ROSUVASTATIN CALCIUM 5 MG/1
5 TABLET, COATED ORAL DAILY
Qty: 100 TABLET | Refills: 3 | Status: SHIPPED | OUTPATIENT
Start: 2024-03-28 | End: 2025-05-02

## 2024-03-28 RX ORDER — FLUTICASONE FUROATE 27.5 UG/1
1 SPRAY, METERED NASAL
Qty: 10 G | Refills: 5 | Status: SHIPPED | OUTPATIENT
Start: 2024-03-28 | End: 2025-03-28

## 2024-03-28 RX ORDER — AZITHROMYCIN 500 MG/1
500 TABLET, FILM COATED ORAL DAILY
Qty: 5 TABLET | Refills: 0 | Status: SHIPPED | OUTPATIENT
Start: 2024-03-28 | End: 2024-04-02

## 2024-03-28 ASSESSMENT — ENCOUNTER SYMPTOMS
RESPIRATORY NEGATIVE: 1
SINUS PRESSURE: 1
HEMATOLOGIC/LYMPHATIC NEGATIVE: 1
MUSCULOSKELETAL NEGATIVE: 1
NEUROLOGICAL NEGATIVE: 1
EYES NEGATIVE: 1
GASTROINTESTINAL NEGATIVE: 1
CONSTITUTIONAL NEGATIVE: 1
ALLERGIC/IMMUNOLOGIC NEGATIVE: 1
PSYCHIATRIC NEGATIVE: 1
ENDOCRINE NEGATIVE: 1
CARDIOVASCULAR NEGATIVE: 1

## 2024-03-28 NOTE — ASSESSMENT & PLAN NOTE
Hypothyroidism - Symptoms not well/controlled (weight gain, fatigue, constipation, cold intolerance). Check TSH  re-start low  dose of Synthroid brand of  25 mcg/qD.

## 2024-03-28 NOTE — PROGRESS NOTES
"Subjective   Patient ID: Mckenna Hardin is a 74 y.o. female who presents for Sinusitis (Sinus congestion-covid negative ). No fever and no chills now she has been sick for 2 weeks now and negative for Covid yesterday     Sinusitis  Associated symptoms include congestion and sinus pressure.        Review of Systems   Constitutional: Negative.    HENT:  Positive for congestion and sinus pressure.    Eyes: Negative.    Respiratory: Negative.     Cardiovascular: Negative.    Gastrointestinal: Negative.    Endocrine: Negative.    Musculoskeletal: Negative.    Skin: Negative.    Allergic/Immunologic: Negative.    Neurological: Negative.    Hematological: Negative.    Psychiatric/Behavioral: Negative.     All other systems reviewed and are negative.      Objective   Ht 1.575 m (5' 2\")   Wt 66.7 kg (147 lb)   BMI 26.89 kg/m²   Blood pressure 120/70, pulse 72, resp. rate 16, height 1.575 m (5' 2\"), weight 66.7 kg (147 lb).   Physical Exam  Vitals and nursing note reviewed.   Constitutional:       Appearance: Normal appearance.   HENT:      Head: Normocephalic and atraumatic.      Right Ear: Tympanic membrane, ear canal and external ear normal.      Left Ear: Tympanic membrane, ear canal and external ear normal. There is no impacted cerumen.      Nose: Nose normal.      Mouth/Throat:      Mouth: Mucous membranes are moist.      Pharynx: Oropharynx is clear.   Eyes:      Extraocular Movements: Extraocular movements intact.      Conjunctiva/sclera: Conjunctivae normal.      Pupils: Pupils are equal, round, and reactive to light.   Cardiovascular:      Rate and Rhythm: Normal rate and regular rhythm.      Pulses: Normal pulses.      Heart sounds: Normal heart sounds. No murmur heard.  Pulmonary:      Effort: Pulmonary effort is normal. No respiratory distress.      Breath sounds: Normal breath sounds. No stridor. No wheezing, rhonchi or rales.   Chest:      Chest wall: No tenderness.   Abdominal:      General: Abdomen is flat. " Bowel sounds are normal. There is no distension.      Palpations: Abdomen is soft. There is no mass.      Tenderness: There is no abdominal tenderness. There is no right CVA tenderness, left CVA tenderness, guarding or rebound.      Hernia: No hernia is present.   Musculoskeletal:         General: Normal range of motion.      Cervical back: Normal range of motion and neck supple.   Skin:     General: Skin is warm.      Capillary Refill: Capillary refill takes less than 2 seconds.   Neurological:      General: No focal deficit present.      Mental Status: She is alert.      Cranial Nerves: No cranial nerve deficit.      Sensory: No sensory deficit.      Motor: No weakness.      Coordination: Coordination normal.      Gait: Gait normal.      Deep Tendon Reflexes: Reflexes normal.   Psychiatric:         Mood and Affect: Mood normal.         Behavior: Behavior normal. Behavior is cooperative.         Thought Content: Thought content normal.         Judgment: Judgment normal.         Assessment/Plan   Problem List Items Addressed This Visit             ICD-10-CM    Bilateral lower extremity edema R60.0    Bipolar I disorder (CMS/HCC) F31.9    CAD (coronary artery disease) I25.10    Primary hypertension I10    Hyperglycemia R73.9    Acquired hypothyroidism E03.9     Hypothyroidism - Symptoms not well/controlled (weight gain, fatigue, constipation, cold intolerance). Check TSH  re-start low  dose of Synthroid brand of  25 mcg/qD.          Relevant Medications    Synthroid 25 mcg tablet    Acute non-recurrent maxillary sinusitis J01.00       Sinusitis - allergic vs. infectious - start Allegra and Flonase I BID and Azithromycin 500 mg qD for 6 days. and avoid cold exposure.           Relevant Medications    azithromycin (Zithromax) 500 mg tablet    fluticasone (Flonase Sensimist) 27.5 mcg/actuation nasal spray    Hypercholesterolemia - Primary E78.00    Relevant Medications    rosuvastatin (Crestor) 5 mg tablet           Varicose veins of bilateral lower extremities with pain I83.813      Acquired hypothyroidism E03.9       Hypothyroidism - Symptoms well/controlled (weight gain, fatigue, constipation, cold intolerance). Check TSH  start low  dose of Synthroid/Levothyroxine  of  25 mcg/qD.            Relevant Medications     levothyroxine (Synthroid, Levoxyl) 25 mcg tablet     Other cirrhosis of liver (CMS/HCC) K74.69                Hyperglycemia R73.9          Educate extensively low carbohydrate diet AND LOW FAT DIET AND increase in exercise activity  and weight loss program and monitor HbA1C and glucose levels and consider Metformin 500 mg BID with meals                Other cirrhosis of liver (CMS/HCC) - Primary K74.69        Cirrhosis - of the liver - monitor liver function and monitor Viral hepatitis and no history of ETOH abuse and possible Depalote effect??? Refer to liver specialist /GI            Relevant Orders      Hepatitis C antibody      Hepatitis B core antibody, total                      CAD (coronary artery disease) I25.10           CAD-coronary artery disease-patient has a history of myocardial infarction/stent placed in stenosed coronary arteries. Target LDL should be below 70 milligrams per deciliter. Reviewed EKG which shows no significant changes. Follows with his cardiologist/  ???He needs to call us with any new symptoms of angina or shortness of breath. Last stress test was/last coronary catheterization was/. Need to address risk factors BioCore controlling cholesterol blood pressure and diabetes. Educate extensively diet. Patient needs to follow in rehabilitation/mild exercises daily.              Relevant Orders       Comprehensive Metabolic Panel       Leg swelling M79.89         Edema - and leg swelling dur to venous insufficiency - responding to low dose Furosemide and recommend: leg elevation and compression stockings -              Mixed hyperlipidemia E78.2         Hypercholesterolemia -  Monitor lipid profile and educate patient upon risks of high cholesterol and targets. Educate diet and change in lifestyle and increase in exercises - Refill: rosuvastatin   and educate compliance with medication and diet.             Relevant Orders       Lipid Panel       Nonalcoholic hepatosteatosis K76.0         Monitor liver function and educate diet Educate extensively low carbohydrate diet AND LOW FAT DIET AND increase in exercise activity   \           Primary hypertension I10         HTN - hypertension well/controlled .Target BP < 130/80  achieved. Educate low salt diet and exercise with weight loss. Educate home self monitoring and diary keeping. Educate risks of elevate blood pressure and benefits of prompt treatment.  Refill              Relevant Orders       Comprehensive Metabolic Panel       Loss of balance R26.89         Loss of balance  - and recommend Physical therapy and strengthening exercises and address gait exercises            Left upper quadrant pain - Primary R10.12         New onset and persistent and recommend CT of the abdomen and pelvis and labs           Relevant Orders       CT abdomen pelvis wo IV contrast       Annual physical exam Z00.00       Varicose veins of bilateral lower extremities with pain I83.813       Hyperglycemia R73.9       Relevant Orders       Hemoglobin A1C       Acquired hypothyroidism E03.9       Relevant Orders       CBC and Auto Differential       TSH with reflex to Free T4 if abnormal                                         Immunizations/Injections      Flu vaccine (IIV4), preservative free *Check age/dose*12/7/2015  Flu vaccine, quadrivalent, high-dose, preservative free, age 65y+ (FLUZONE)10/5/2022, 10/5/2021, 9/21/2020  Influenza, High Dose Seasonal, Preservative Free8/31/2017  Influenza, Seasonal, Quadrivalent, Xizenptbzc36/2/2023  Influenza, Scnrqectlic30/12/2012, 10/23/2011  Influenza, seasonal, ugfuckiylw14/15/2014, 11/2/2013  Influenza, seasonal,  injectable, preservative free9/28/2016  Influenza, trivalent, adjuvanted9/6/2019, 10/3/2018  Moderna COVID-19 vaccine, bivalent, blue cap/gray label *Check age/dose*9/9/2022  Moderna SARS-CoV-2 Vaccination4/11/2022, 11/1/2021, 3/12/2021,  ... (1 more)  Pfizer COVID-19 vaccine, Fall 2023, 12 years and older, (30mcg/0.3mL)10/2/2023  Pneumococcal conjugate vaccine, 13-valent (PREVNAR 13)10/3/2018  Pneumococcal polysaccharide vaccine, 23-valent, age 2 years and older (PNEUMOVAX 23)10/7/2019, 4/7/2010  Tdap vaccine, age 7 year and older (BOOSTRIX, ADACEL)4/7/2010                  Immunizations/Injections     Flu vaccine (IIV4), preservative free *Check age/dose*12/7/2015  Flu vaccine, quadrivalent, high-dose, preservative free, age 65y+ (FLUZONE)10/5/2022, 10/5/2021, 9/21/2020  Influenza, High Dose Seasonal, Preservative Free8/31/2017  Influenza, Seasonal, Quadrivalent, Ujmfwhwebx16/2/2023  Influenza, Fgjcthzyfua72/12/2012, 10/23/2011  Influenza, seasonal, nezazkigqi65/15/2014, 11/2/2013  Influenza, seasonal, injectable, preservative free9/28/2016  Influenza, trivalent, adjuvanted9/6/2019, 10/3/2018  Moderna COVID-19 vaccine, bivalent, blue cap/gray label *Check age/dose*9/9/2022  Moderna SARS-CoV-2 Vaccination4/11/2022, 11/1/2021, 3/12/2021,  ... (1 more)  Pfizer COVID-19 vaccine, Fall 2023, 12 years and older, (30mcg/0.3mL)10/2/2023  Pneumococcal conjugate vaccine, 13-valent (PREVNAR 13)10/3/2018  Pneumococcal polysaccharide vaccine, 23-valent, age 2 years and older (PNEUMOVAX 23)10/7/2019, 4/7/2010  Tdap vaccine, age 7 year and older (BOOSTRIX, ADACEL)

## 2024-03-28 NOTE — ASSESSMENT & PLAN NOTE
Sinusitis - allergic vs. infectious - start Allegra and Flonase I BID and Azithromycin 500 mg qD for 6 days. and avoid cold exposure.

## 2024-04-02 ENCOUNTER — APPOINTMENT (OUTPATIENT)
Dept: PRIMARY CARE | Facility: CLINIC | Age: 74
End: 2024-04-02
Payer: MEDICARE

## 2024-04-09 NOTE — TELEPHONE ENCOUNTER
MRN: [id number] patient called regarding ear infection continues to have pain in right ear after antibiotic please contact

## 2024-04-10 ENCOUNTER — APPOINTMENT (OUTPATIENT)
Dept: GASTROENTEROLOGY | Facility: CLINIC | Age: 74
End: 2024-04-10
Payer: MEDICARE

## 2024-04-10 ENCOUNTER — TELEPHONE (OUTPATIENT)
Dept: PRIMARY CARE | Facility: CLINIC | Age: 74
End: 2024-04-10

## 2024-04-16 ENCOUNTER — APPOINTMENT (OUTPATIENT)
Dept: GASTROENTEROLOGY | Facility: CLINIC | Age: 74
End: 2024-04-16
Payer: MEDICARE

## 2024-04-19 ENCOUNTER — APPOINTMENT (OUTPATIENT)
Dept: GASTROENTEROLOGY | Facility: EXTERNAL LOCATION | Age: 74
End: 2024-04-19
Payer: MEDICARE

## 2024-04-23 DIAGNOSIS — E03.9 ACQUIRED HYPOTHYROIDISM: ICD-10-CM

## 2024-04-23 RX ORDER — LEVOTHYROXINE SODIUM 25 UG/1
25 TABLET ORAL DAILY
Qty: 90 TABLET | Refills: 0 | Status: SHIPPED | OUTPATIENT
Start: 2024-04-23

## 2024-04-29 DIAGNOSIS — R73.01 ELEVATED FASTING GLUCOSE: ICD-10-CM

## 2024-04-29 RX ORDER — BLOOD SUGAR DIAGNOSTIC
STRIP MISCELLANEOUS
Qty: 100 STRIP | Refills: 0 | Status: SHIPPED | OUTPATIENT
Start: 2024-04-29

## 2024-05-02 ENCOUNTER — OFFICE VISIT (OUTPATIENT)
Dept: PRIMARY CARE | Facility: CLINIC | Age: 74
End: 2024-05-02
Payer: COMMERCIAL

## 2024-05-02 VITALS
SYSTOLIC BLOOD PRESSURE: 128 MMHG | BODY MASS INDEX: 27.05 KG/M2 | HEIGHT: 62 IN | DIASTOLIC BLOOD PRESSURE: 70 MMHG | WEIGHT: 147 LBS | RESPIRATION RATE: 16 BRPM | OXYGEN SATURATION: 97 % | HEART RATE: 54 BPM

## 2024-05-02 DIAGNOSIS — M79.89 LEG SWELLING: Primary | ICD-10-CM

## 2024-05-02 DIAGNOSIS — K76.0 NONALCOHOLIC HEPATOSTEATOSIS: ICD-10-CM

## 2024-05-02 DIAGNOSIS — E03.9 ACQUIRED HYPOTHYROIDISM: ICD-10-CM

## 2024-05-02 DIAGNOSIS — E78.2 MIXED HYPERLIPIDEMIA: ICD-10-CM

## 2024-05-02 DIAGNOSIS — R60.0 BILATERAL LOWER EXTREMITY EDEMA: ICD-10-CM

## 2024-05-02 DIAGNOSIS — I25.118 CORONARY ARTERY DISEASE INVOLVING NATIVE CORONARY ARTERY OF NATIVE HEART WITH OTHER FORM OF ANGINA PECTORIS (CMS-HCC): ICD-10-CM

## 2024-05-02 DIAGNOSIS — K74.69 OTHER CIRRHOSIS OF LIVER (MULTI): ICD-10-CM

## 2024-05-02 PROCEDURE — 1159F MED LIST DOCD IN RCRD: CPT | Performed by: INTERNAL MEDICINE

## 2024-05-02 PROCEDURE — 1036F TOBACCO NON-USER: CPT | Performed by: INTERNAL MEDICINE

## 2024-05-02 PROCEDURE — 3078F DIAST BP <80 MM HG: CPT | Performed by: INTERNAL MEDICINE

## 2024-05-02 PROCEDURE — 99214 OFFICE O/P EST MOD 30 MIN: CPT | Performed by: INTERNAL MEDICINE

## 2024-05-02 PROCEDURE — 3074F SYST BP LT 130 MM HG: CPT | Performed by: INTERNAL MEDICINE

## 2024-05-02 RX ORDER — SPIRONOLACTONE 50 MG/1
50 TABLET, FILM COATED ORAL DAILY
Qty: 30 TABLET | Refills: 2 | Status: SHIPPED | OUTPATIENT
Start: 2024-05-02 | End: 2024-05-29

## 2024-05-02 RX ORDER — LEVOTHYROXINE SODIUM 25 UG/1
25 TABLET ORAL DAILY
Qty: 30 TABLET | Refills: 11 | Status: SHIPPED | OUTPATIENT
Start: 2024-05-02 | End: 2025-05-02

## 2024-05-02 ASSESSMENT — ENCOUNTER SYMPTOMS
CARDIOVASCULAR NEGATIVE: 1
RESPIRATORY NEGATIVE: 1
HEMATOLOGIC/LYMPHATIC NEGATIVE: 1
MUSCULOSKELETAL NEGATIVE: 1
ENDOCRINE NEGATIVE: 1
GASTROINTESTINAL NEGATIVE: 1
ALLERGIC/IMMUNOLOGIC NEGATIVE: 1
OCCASIONAL FEELINGS OF UNSTEADINESS: 0
NEUROLOGICAL NEGATIVE: 1
EYES NEGATIVE: 1
CONSTITUTIONAL NEGATIVE: 1
LOSS OF SENSATION IN FEET: 0
PSYCHIATRIC NEGATIVE: 1
DEPRESSION: 0

## 2024-05-02 ASSESSMENT — PATIENT HEALTH QUESTIONNAIRE - PHQ9
2. FEELING DOWN, DEPRESSED OR HOPELESS: NOT AT ALL
1. LITTLE INTEREST OR PLEASURE IN DOING THINGS: NOT AT ALL
SUM OF ALL RESPONSES TO PHQ9 QUESTIONS 1 AND 2: 0

## 2024-05-02 NOTE — ASSESSMENT & PLAN NOTE
Edema - and leg swelling dur to venous insufficiency - responding to low dose Furosemide and recommend: leg elevation and compression stockings - increase the Spironolactone to 50 mg daily

## 2024-05-02 NOTE — ASSESSMENT & PLAN NOTE
Edema - and leg swelling dur to venous insufficiency - responding to low dose Furosemide and recommend: leg elevation and com

## 2024-05-02 NOTE — PROGRESS NOTES
"Subjective   Patient ID: Mckenna Hardin is a 74 y.o. female who presents for Follow-up (2 month). Complains of  cold feet and leg swelling and left toe bluish discoloration and has numbness in the feet     HPI     Review of Systems   Constitutional: Negative.    HENT: Negative.     Eyes: Negative.    Respiratory: Negative.     Cardiovascular: Negative.    Gastrointestinal: Negative.    Endocrine: Negative.    Musculoskeletal: Negative.    Skin: Negative.    Allergic/Immunologic: Negative.    Neurological: Negative.    Hematological: Negative.    Psychiatric/Behavioral: Negative.     All other systems reviewed and are negative.      Objective   /86 (BP Location: Left arm, Patient Position: Sitting)   Pulse 54   Resp 16   Ht 1.575 m (5' 2\")   Wt 66.7 kg (147 lb)   SpO2 97%   BMI 26.89 kg/m²   Blood pressure 128/70, pulse 54, resp. rate 16, height 1.575 m (5' 2\"), weight 66.7 kg (147 lb), SpO2 97%.   Physical Exam  Vitals and nursing note reviewed.   Constitutional:       Appearance: Normal appearance.   HENT:      Head: Normocephalic and atraumatic.      Right Ear: Tympanic membrane, ear canal and external ear normal.      Left Ear: Tympanic membrane, ear canal and external ear normal. There is no impacted cerumen.      Nose: Nose normal.      Mouth/Throat:      Mouth: Mucous membranes are moist.      Pharynx: Oropharynx is clear.   Eyes:      Extraocular Movements: Extraocular movements intact.      Conjunctiva/sclera: Conjunctivae normal.      Pupils: Pupils are equal, round, and reactive to light.   Cardiovascular:      Rate and Rhythm: Normal rate and regular rhythm.      Pulses: Normal pulses.      Heart sounds: Normal heart sounds. No murmur heard.  Pulmonary:      Effort: Pulmonary effort is normal. No respiratory distress.      Breath sounds: Normal breath sounds. No stridor. No wheezing, rhonchi or rales.   Chest:      Chest wall: No tenderness.   Abdominal:      General: Abdomen is flat. Bowel " sounds are normal. There is no distension.      Palpations: Abdomen is soft. There is no mass.      Tenderness: There is no abdominal tenderness. There is no right CVA tenderness, left CVA tenderness, guarding or rebound.      Hernia: No hernia is present.   Musculoskeletal:         General: Normal range of motion.      Cervical back: Normal range of motion and neck supple.   Skin:     General: Skin is warm.      Capillary Refill: Capillary refill takes less than 2 seconds.   Neurological:      General: No focal deficit present.      Mental Status: She is alert.      Cranial Nerves: No cranial nerve deficit.      Sensory: No sensory deficit.      Motor: No weakness.      Coordination: Coordination normal.      Gait: Gait normal.      Deep Tendon Reflexes: Reflexes normal.   Psychiatric:         Mood and Affect: Mood normal.         Behavior: Behavior normal. Behavior is cooperative.         Thought Content: Thought content normal.         Judgment: Judgment normal.         Assessment/Plan   Problem List Items Addressed This Visit             ICD-10-CM    Bilateral lower extremity edema R60.0     Edema - and leg swelling dur to venous insufficiency - responding to low dose Furosemide and recommend: leg elevation and com          Relevant Orders    Compression Stockings 18-30 mmHg    CAD (coronary artery disease) I25.10     CAD-coronary artery disease-patient has a history of myocardial infarction/stent placed in stenosed coronary arteries. Target LDL should be below 70 milligrams per deciliter. Reviewed EKG which shows no significant changes. Follows with his cardiologist/  ???He needs to call us with any new symptoms of angina or shortness of breath. Last stress test was/last coronary catheterization was/. Need to address risk factors BioCore controlling cholesterol blood pressure and diabetes. Educate extensively diet. Patient needs to follow in rehabilitation/mild exercises daily.            Leg swelling -  Primary M79.89     Edema - and leg swelling dur to venous insufficiency - responding to low dose Furosemide and recommend: leg elevation and compression stockings - increase the Spironolactone to 50 mg daily          Relevant Medications    spironolactone (Aldactone) 50 mg tablet    Mixed hyperlipidemia E78.2     Hypercholesterolemia - Monitor lipid profile and educate patient upon risks of high cholesterol and targets. Educate diet and change in lifestyle and increase in exercises - Refill: rosuvastatin   and educate compliance with medication and diet.           Nonalcoholic hepatosteatosis K76.0     Monitor liver function and educate diet Educate extensively low carbohydrate diet AND LOW FAT DIET AND increase in exercise activity   \         Acquired hypothyroidism E03.9     Hypothyroidism - Symptoms not well/controlled (weight gain, fatigue, constipation, cold intolerance). Check TSH  re-start low  dose of Synthroid brand of  25 mcg/qD.          Relevant Medications    levothyroxine (Synthroid, Levoxyl) 25 mcg tablet    Other cirrhosis of liver (Multi) K74.69     Cirrhosis - of the liver - monitor liver function and monitor Viral hepatitis and no history of ETOH abuse and possible Depalote effect??? Refer to liver specialist /GI

## 2024-05-08 ENCOUNTER — APPOINTMENT (OUTPATIENT)
Dept: GASTROENTEROLOGY | Facility: CLINIC | Age: 74
End: 2024-05-08
Payer: MEDICARE

## 2024-05-29 DIAGNOSIS — M79.89 LEG SWELLING: ICD-10-CM

## 2024-05-29 RX ORDER — SPIRONOLACTONE 50 MG/1
50 TABLET, FILM COATED ORAL DAILY
Qty: 90 TABLET | Refills: 0 | Status: SHIPPED | OUTPATIENT
Start: 2024-05-29

## 2024-07-18 DIAGNOSIS — Z79.899 PHARMACOLOGIC THERAPY: Primary | ICD-10-CM

## 2024-07-30 DIAGNOSIS — J45.909 ASTHMA, UNSPECIFIED ASTHMA SEVERITY, UNSPECIFIED WHETHER COMPLICATED, UNSPECIFIED WHETHER PERSISTENT (HHS-HCC): ICD-10-CM

## 2024-07-30 RX ORDER — ALBUTEROL SULFATE 90 UG/1
1-2 AEROSOL, METERED RESPIRATORY (INHALATION) EVERY 4 HOURS PRN
Qty: 18 G | Refills: 1 | Status: SHIPPED | OUTPATIENT
Start: 2024-07-30

## 2024-07-31 ENCOUNTER — TELEPHONE (OUTPATIENT)
Dept: HEMATOLOGY/ONCOLOGY | Facility: CLINIC | Age: 74
End: 2024-07-31
Payer: COMMERCIAL

## 2024-08-01 DIAGNOSIS — J45.909 MODERATE ASTHMA, UNSPECIFIED WHETHER COMPLICATED, UNSPECIFIED WHETHER PERSISTENT (HHS-HCC): ICD-10-CM

## 2024-08-02 ENCOUNTER — APPOINTMENT (OUTPATIENT)
Dept: AUDIOLOGY | Facility: CLINIC | Age: 74
End: 2024-08-02
Payer: COMMERCIAL

## 2024-08-02 ENCOUNTER — APPOINTMENT (OUTPATIENT)
Dept: OTOLARYNGOLOGY | Facility: CLINIC | Age: 74
End: 2024-08-02
Payer: COMMERCIAL

## 2024-08-02 DIAGNOSIS — R73.01 ELEVATED FASTING GLUCOSE: ICD-10-CM

## 2024-08-02 RX ORDER — BLOOD SUGAR DIAGNOSTIC
STRIP MISCELLANEOUS
Qty: 100 STRIP | Refills: 0 | Status: SHIPPED | OUTPATIENT
Start: 2024-08-02

## 2024-08-05 ENCOUNTER — LAB (OUTPATIENT)
Dept: LAB | Facility: LAB | Age: 74
End: 2024-08-05
Payer: COMMERCIAL

## 2024-08-05 DIAGNOSIS — D50.0 IRON DEFICIENCY ANEMIA DUE TO CHRONIC BLOOD LOSS: ICD-10-CM

## 2024-08-05 LAB
ALBUMIN SERPL BCP-MCNC: 4.3 G/DL (ref 3.4–5)
ALP SERPL-CCNC: 135 U/L (ref 33–136)
ALT SERPL W P-5'-P-CCNC: 47 U/L (ref 7–45)
ANION GAP SERPL CALC-SCNC: 17 MMOL/L (ref 10–20)
AST SERPL W P-5'-P-CCNC: 41 U/L (ref 9–39)
BASOPHILS # BLD AUTO: 0.04 X10*3/UL (ref 0–0.1)
BASOPHILS NFR BLD AUTO: 0.4 %
BILIRUB SERPL-MCNC: 0.7 MG/DL (ref 0–1.2)
BUN SERPL-MCNC: 9 MG/DL (ref 6–23)
CALCIUM SERPL-MCNC: 10.2 MG/DL (ref 8.6–10.6)
CHLORIDE SERPL-SCNC: 103 MMOL/L (ref 98–107)
CO2 SERPL-SCNC: 27 MMOL/L (ref 21–32)
CREAT SERPL-MCNC: 0.81 MG/DL (ref 0.5–1.05)
EGFRCR SERPLBLD CKD-EPI 2021: 77 ML/MIN/1.73M*2
EOSINOPHIL # BLD AUTO: 0.03 X10*3/UL (ref 0–0.4)
EOSINOPHIL NFR BLD AUTO: 0.3 %
ERYTHROCYTE [DISTWIDTH] IN BLOOD BY AUTOMATED COUNT: 12.8 % (ref 11.5–14.5)
FERRITIN SERPL-MCNC: 251 NG/ML (ref 8–150)
FOLATE SERPL-MCNC: >24 NG/ML
GLUCOSE SERPL-MCNC: 139 MG/DL (ref 74–99)
HCT VFR BLD AUTO: 46 % (ref 36–46)
HGB BLD-MCNC: 15.1 G/DL (ref 12–16)
IMM GRANULOCYTES # BLD AUTO: 0.02 X10*3/UL (ref 0–0.5)
IMM GRANULOCYTES NFR BLD AUTO: 0.2 % (ref 0–0.9)
IRON SATN MFR SERPL: 33 % (ref 25–45)
IRON SERPL-MCNC: 146 UG/DL (ref 35–150)
LYMPHOCYTES # BLD AUTO: 5.36 X10*3/UL (ref 0.8–3)
LYMPHOCYTES NFR BLD AUTO: 55.9 %
MCH RBC QN AUTO: 31.4 PG (ref 26–34)
MCHC RBC AUTO-ENTMCNC: 32.8 G/DL (ref 32–36)
MCV RBC AUTO: 96 FL (ref 80–100)
MONOCYTES # BLD AUTO: 0.86 X10*3/UL (ref 0.05–0.8)
MONOCYTES NFR BLD AUTO: 9 %
NEUTROPHILS # BLD AUTO: 3.27 X10*3/UL (ref 1.6–5.5)
NEUTROPHILS NFR BLD AUTO: 34.2 %
NRBC BLD-RTO: 0 /100 WBCS (ref 0–0)
PLATELET # BLD AUTO: 240 X10*3/UL (ref 150–450)
POTASSIUM SERPL-SCNC: 4.8 MMOL/L (ref 3.5–5.3)
PROT SERPL-MCNC: 6.9 G/DL (ref 6.4–8.2)
RBC # BLD AUTO: 4.81 X10*6/UL (ref 4–5.2)
RBC MORPH BLD: NORMAL
SODIUM SERPL-SCNC: 142 MMOL/L (ref 136–145)
SPHEROCYTES BLD QL SMEAR: NORMAL
TIBC SERPL-MCNC: 439 UG/DL (ref 240–445)
UIBC SERPL-MCNC: 293 UG/DL (ref 110–370)
VIT B12 SERPL-MCNC: 461 PG/ML (ref 211–911)
WBC # BLD AUTO: 9.6 X10*3/UL (ref 4.4–11.3)

## 2024-08-05 PROCEDURE — 83550 IRON BINDING TEST: CPT

## 2024-08-05 PROCEDURE — 82728 ASSAY OF FERRITIN: CPT

## 2024-08-05 PROCEDURE — 82607 VITAMIN B-12: CPT

## 2024-08-05 PROCEDURE — 36415 COLL VENOUS BLD VENIPUNCTURE: CPT

## 2024-08-05 PROCEDURE — 85025 COMPLETE CBC W/AUTO DIFF WBC: CPT

## 2024-08-05 PROCEDURE — 80053 COMPREHEN METABOLIC PANEL: CPT

## 2024-08-05 PROCEDURE — 82746 ASSAY OF FOLIC ACID SERUM: CPT

## 2024-08-05 PROCEDURE — 83540 ASSAY OF IRON: CPT

## 2024-08-06 ENCOUNTER — APPOINTMENT (OUTPATIENT)
Dept: HEMATOLOGY/ONCOLOGY | Facility: CLINIC | Age: 74
End: 2024-08-06
Payer: MEDICARE

## 2024-08-06 ENCOUNTER — APPOINTMENT (OUTPATIENT)
Dept: HEMATOLOGY/ONCOLOGY | Facility: CLINIC | Age: 74
End: 2024-08-06
Payer: COMMERCIAL

## 2024-08-06 DIAGNOSIS — D50.0 IRON DEFICIENCY ANEMIA DUE TO CHRONIC BLOOD LOSS: Primary | ICD-10-CM

## 2024-08-20 ENCOUNTER — OFFICE VISIT (OUTPATIENT)
Dept: HEMATOLOGY/ONCOLOGY | Facility: CLINIC | Age: 74
End: 2024-08-20
Payer: COMMERCIAL

## 2024-08-20 VITALS
SYSTOLIC BLOOD PRESSURE: 187 MMHG | RESPIRATION RATE: 17 BRPM | HEIGHT: 63 IN | DIASTOLIC BLOOD PRESSURE: 61 MMHG | TEMPERATURE: 97.1 F | WEIGHT: 151.41 LBS | HEART RATE: 61 BPM | BODY MASS INDEX: 26.83 KG/M2 | OXYGEN SATURATION: 97 %

## 2024-08-20 DIAGNOSIS — D72.820 LYMPHOCYTOSIS: Primary | ICD-10-CM

## 2024-08-20 DIAGNOSIS — D50.0 IRON DEFICIENCY ANEMIA DUE TO CHRONIC BLOOD LOSS: ICD-10-CM

## 2024-08-20 LAB
LDH SERPL L TO P-CCNC: 145 U/L (ref 84–246)
URATE SERPL-MCNC: 7.3 MG/DL (ref 2.3–6.7)

## 2024-08-20 PROCEDURE — 1159F MED LIST DOCD IN RCRD: CPT | Performed by: INTERNAL MEDICINE

## 2024-08-20 PROCEDURE — 1125F AMNT PAIN NOTED PAIN PRSNT: CPT | Performed by: INTERNAL MEDICINE

## 2024-08-20 PROCEDURE — 3078F DIAST BP <80 MM HG: CPT | Performed by: INTERNAL MEDICINE

## 2024-08-20 PROCEDURE — 82668 ASSAY OF ERYTHROPOIETIN: CPT | Performed by: INTERNAL MEDICINE

## 2024-08-20 PROCEDURE — 99214 OFFICE O/P EST MOD 30 MIN: CPT | Performed by: INTERNAL MEDICINE

## 2024-08-20 PROCEDURE — 3008F BODY MASS INDEX DOCD: CPT | Performed by: INTERNAL MEDICINE

## 2024-08-20 PROCEDURE — 3077F SYST BP >= 140 MM HG: CPT | Performed by: INTERNAL MEDICINE

## 2024-08-20 PROCEDURE — 1160F RVW MEDS BY RX/DR IN RCRD: CPT | Performed by: INTERNAL MEDICINE

## 2024-08-20 PROCEDURE — 84550 ASSAY OF BLOOD/URIC ACID: CPT | Mod: WESLAB | Performed by: INTERNAL MEDICINE

## 2024-08-20 PROCEDURE — 81450 HL NEO GSAP 5-50DNA/DNA&RNA: CPT | Performed by: INTERNAL MEDICINE

## 2024-08-20 PROCEDURE — 88184 FLOWCYTOMETRY/ TC 1 MARKER: CPT | Mod: TC | Performed by: INTERNAL MEDICINE

## 2024-08-20 PROCEDURE — 83615 LACTATE (LD) (LDH) ENZYME: CPT | Mod: WESLAB | Performed by: INTERNAL MEDICINE

## 2024-08-20 ASSESSMENT — COLUMBIA-SUICIDE SEVERITY RATING SCALE - C-SSRS
6. HAVE YOU EVER DONE ANYTHING, STARTED TO DO ANYTHING, OR PREPARED TO DO ANYTHING TO END YOUR LIFE?: NO
2. HAVE YOU ACTUALLY HAD ANY THOUGHTS OF KILLING YOURSELF?: NO
1. IN THE PAST MONTH, HAVE YOU WISHED YOU WERE DEAD OR WISHED YOU COULD GO TO SLEEP AND NOT WAKE UP?: NO

## 2024-08-20 ASSESSMENT — ENCOUNTER SYMPTOMS
LOSS OF SENSATION IN FEET: 0
DEPRESSION: 1
OCCASIONAL FEELINGS OF UNSTEADINESS: 0

## 2024-08-20 ASSESSMENT — PAIN SCALES - GENERAL: PAINLEVEL: 4

## 2024-08-20 NOTE — PROGRESS NOTES
Patient here for follow up visit iron deficiency anemia.     No concerns or complaints noted at this time.   Patient here alone this visit.     Manual bp 148/62. BP was noted to be elevated prior to entering the room but patient voiced that she was nervous as she almost sustained a car accident on her way in on the freeway.   Medications and Allergies reviewed and reconciled this visit.    Follow up per MD request. Labs today and follow up in 3 months with labs.    Patient reports availability and use of [facility], Reviewed this is a good place to communicate with the team as well as review labs and upcoming orders.      No barriers to education noted, patient agrees to current plan and verbalized understanding using teach back method.

## 2024-08-20 NOTE — PROGRESS NOTES
"Patient ID: Mckenna Hardin is a 74 y.o. female.    Subjective: lymphocytosis      Denies any complaints other then bilateral leg edema    Heme/Onc History:  02/2024: 73-year-old woman with a history of bilateral lower extremity edema, coronary artery disease, with two stents, NSTEMI, nonalcoholic hepatosteatosis, primary hypertension, loss of balance, hypothyroidism, hyperglycemia, varicose veins, elevated alkaline phosphatase,bipolar disorder, referred for lymphocytosis.       Family History:     Family History   Problem Relation Name Age of Onset    Breast cancer Sister         Past Medical History  Past Medical History:   Diagnosis Date    Atypical ductal hyperplasia, breast     left breast        Surgical history:   Past Surgical History:   Procedure Laterality Date    BREAST BIOPSY Left       reports that she quit smoking about 34 years ago. Her smoking use included cigarettes. She has never been exposed to tobacco smoke. She has never used smokeless tobacco.    Review Of Systems:  As per in HPI, otherwise all other 12 point of ROS are negative.    Physical Exam:  BP (!) 187/61 (BP Location: Left arm, Patient Position: Sitting, BP Cuff Size: Adult)   Pulse 61   Temp 36.2 °C (97.1 °F) (Temporal)   Resp 17   Ht 1.601 m (5' 3.03\")   Wt 68.7 kg (151 lb 6.6 oz)   SpO2 97%   BMI 26.80 kg/m²   BSA: 1.75 meters squared  General: awake/alert/oriented x3, no distress, alert and cooperative  Head: Short hair fully covering scalp. Symmetric facial expressions  Eyes: PERRL, EOMI, clear sclera, eyebrows present.  Ears/Nose/Mouth/Throat:  Oral mucous membranes moist. No oral ulcers. No palpable pre/post-auricular lymph nodes  Neck: No palpable cervical chain lymph nodes  Respiratory: unlabored breathing on room air, good chest expansion, thorax symmetric  Cardio: Regular rate and rhythm, normal S1 and S2, radial pulses symmetric  GI: Nondistended, soft, non-tender abdomen  Musculoskeletal: Normal muscle bulk and tone, " ROM intact, no joint swelling.  Rises from chair and walks unassisted.  Extremities: No ankle swelling, no arm or leg wounds  Neuro: Alert, cognition intact, speech normal. Facial expressions symmetric.  No motor deficits noted. Sensation intact to touch and hot/cold.   Able to stand from seated position unassisted and walks around the room unassisted.  Psychological: Appropriate mood and behavior.  Skin: Warm and dry, no lesions, no rashes    Results:  Diagnostic Results   Lab Results   Component Value Date    WBC 9.6 08/05/2024    HGB 15.1 08/05/2024    HCT 46.0 08/05/2024    MCV 96 08/05/2024     08/05/2024     Lab Results   Component Value Date    CALCIUM 10.2 08/05/2024     08/05/2024    K 4.8 08/05/2024    CO2 27 08/05/2024     08/05/2024    BUN 9 08/05/2024    CREATININE 0.81 08/05/2024    ALT 47 (H) 08/05/2024    AST 41 (H) 08/05/2024     Lab on 08/05/2024   Component Date Value Ref Range Status    Iron 08/05/2024 146  35 - 150 ug/dL Final    UIBC 08/05/2024 293  110 - 370 ug/dL Final    TIBC 08/05/2024 439  240 - 445 ug/dL Final    % Saturation 08/05/2024 33  25 - 45 % Final    Vitamin B12 08/05/2024 461  211 - 911 pg/mL Final    Ferritin 08/05/2024 251 (H)  8 - 150 ng/mL Final    Glucose 08/05/2024 139 (H)  74 - 99 mg/dL Final    Sodium 08/05/2024 142  136 - 145 mmol/L Final    Potassium 08/05/2024 4.8  3.5 - 5.3 mmol/L Final    Chloride 08/05/2024 103  98 - 107 mmol/L Final    Bicarbonate 08/05/2024 27  21 - 32 mmol/L Final    Anion Gap 08/05/2024 17  10 - 20 mmol/L Final    Urea Nitrogen 08/05/2024 9  6 - 23 mg/dL Final    Creatinine 08/05/2024 0.81  0.50 - 1.05 mg/dL Final    eGFR 08/05/2024 77  >60 mL/min/1.73m*2 Final    Calculations of estimated GFR are performed using the 2021 CKD-EPI Study Refit equation without the race variable for the IDMS-Traceable creatinine methods.  https://jasn.asnjournals.org/content/early/2021/09/22/ASN.9581924604    Calcium 08/05/2024 10.2  8.6 - 10.6  mg/dL Final    Albumin 08/05/2024 4.3  3.4 - 5.0 g/dL Final    Alkaline Phosphatase 08/05/2024 135  33 - 136 U/L Final    Total Protein 08/05/2024 6.9  6.4 - 8.2 g/dL Final    AST 08/05/2024 41 (H)  9 - 39 U/L Final    Bilirubin, Total 08/05/2024 0.7  0.0 - 1.2 mg/dL Final    ALT 08/05/2024 47 (H)  7 - 45 U/L Final    Patients treated with Sulfasalazine may generate falsely decreased results for ALT.    Folate, Serum 08/05/2024 >24.0  >5.0 ng/mL Final    WBC 08/05/2024 9.6  4.4 - 11.3 x10*3/uL Final    nRBC 08/05/2024 0.0  0.0 - 0.0 /100 WBCs Final    RBC 08/05/2024 4.81  4.00 - 5.20 x10*6/uL Final    Hemoglobin 08/05/2024 15.1  12.0 - 16.0 g/dL Final    Hematocrit 08/05/2024 46.0  36.0 - 46.0 % Final    MCV 08/05/2024 96  80 - 100 fL Final    MCH 08/05/2024 31.4  26.0 - 34.0 pg Final    MCHC 08/05/2024 32.8  32.0 - 36.0 g/dL Final    RDW 08/05/2024 12.8  11.5 - 14.5 % Final    Platelets 08/05/2024 240  150 - 450 x10*3/uL Final    Neutrophils % 08/05/2024 34.2  40.0 - 80.0 % Final    Immature Granulocytes %, Automated 08/05/2024 0.2  0.0 - 0.9 % Final    Immature Granulocyte Count (IG) includes promyelocytes, myelocytes and metamyelocytes but does not include bands. Percent differential counts (%) should be interpreted in the context of the absolute cell counts (cells/UL).    Lymphocytes % 08/05/2024 55.9  13.0 - 44.0 % Final    Monocytes % 08/05/2024 9.0  2.0 - 10.0 % Final    Eosinophils % 08/05/2024 0.3  0.0 - 6.0 % Final    Basophils % 08/05/2024 0.4  0.0 - 2.0 % Final    Neutrophils Absolute 08/05/2024 3.27  1.60 - 5.50 x10*3/uL Final    Percent differential counts (%) should be interpreted in the context of the absolute cell counts (cells/uL).    Immature Granulocytes Absolute, Au* 08/05/2024 0.02  0.00 - 0.50 x10*3/uL Final    Lymphocytes Absolute 08/05/2024 5.36 (H)  0.80 - 3.00 x10*3/uL Final    Monocytes Absolute 08/05/2024 0.86 (H)  0.05 - 0.80 x10*3/uL Final    Eosinophils Absolute 08/05/2024 0.03  0.00  - 0.40 x10*3/uL Final    Basophils Absolute 08/05/2024 0.04  0.00 - 0.10 x10*3/uL Final    RBC Morphology 08/05/2024 See Below   Final    Spherocytes 08/05/2024 Few   Final          Current Outpatient Medications:     albuterol 90 mcg/actuation inhaler, Inhale 1-2 puffs every 4 hours if needed for wheezing., Disp: 18 g, Rfl: 1    b complex vitamins capsule, Take 1 capsule by mouth once daily., Disp: , Rfl:     blood-glucose meter (OneTouch Ultra2 Meter) misc, 1 kit once daily., Disp: 1 each, Rfl: 0    divalproex (Depakote ER) 500 mg 24 hr tablet, Take 1 tablet (500 mg) by mouth once daily. For 30 days, Disp: , Rfl:     fluticasone (Flonase) 50 mcg/actuation nasal spray, Administer 1 spray into each nostril once daily., Disp: , Rfl:     inhalational spacing device inhaler, Use as directed with inhalers, Disp: 1 each, Rfl: 0    levothyroxine (Synthroid, Levoxyl) 25 mcg tablet, Take 1 tablet (25 mcg) by mouth once daily., Disp: 30 tablet, Rfl: 11    lidocaine 4 % patch, As directed externally, Disp: , Rfl:     magnesium citrate 100 mg tablet, Take by mouth., Disp: , Rfl:     milk thistle 175 mg tablet, Take 1 tablet (175 mg) by mouth once daily., Disp: , Rfl:     omega-3 fatty acids (FISH OIL CONCENTRATE ORAL), Take by mouth., Disp: , Rfl:     OneTouch Delica Plus Lancet 33 gauge misc, , Disp: , Rfl:     OneTouch Ultra Test strip, TEST ONCE DAILY, Disp: 100 strip, Rfl: 0    spironolactone (Aldactone) 50 mg tablet, TAKE 1 TABLET BY MOUTH EVERY DAY, Disp: 90 tablet, Rfl: 0    ascorbic acid (Vitamin C) 500 mg tablet, Take 2 tablets (1,000 mg) by mouth once daily. For 30 days, Disp: , Rfl:     aspirin 81 mg chewable tablet, Chew 1 tablet (81 mg) once daily. For 30 days, Disp: , Rfl:     cholecalciferol (Vitamin D3) 25 MCG (1000 UT) tablet, Take 1 tablet (1,000 Units) by mouth once daily., Disp: , Rfl:     elderberry fruit 350 mg capsule, Take by mouth., Disp: , Rfl:     fluticasone (Flonase Sensimist) 27.5 mcg/actuation  nasal spray, Administer 1 spray into each nostril once daily., Disp: 10 g, Rfl: 5    loratadine (Claritin) 10 mg tablet, Take 1 tablet (10 mg) by mouth once daily as needed., Disp: , Rfl:     rosuvastatin (Crestor) 5 mg tablet, Take 1 tablet (5 mg) by mouth once daily. (Patient not taking: Reported on 8/20/2024), Disp: 100 tablet, Rfl: 3     Radiology:    Pathology:    Assessment/Plan:    1- Lymphocytosis and ertythrocytosis:    In 02/24, patient was referred for lymphocytosis. There is no evidence of iron def or any cytopenias. But her hbg was above 15. She is a never smoker. Does snore but declines DAMIAN.    She may have B cell monoclonal lymphocytosis or CLL. Flow cytometry ordered    Myeloid NGS , Epo level     HFE mut test ordered for high H/H work up which showed heterozygous H63D mut which explains her high H/H and iron overload. Phlebotomy ordered. Instructed to start taking ASA 81 mg every day.    Rtc in 3 months to discuss results and further management plan.    Diagnoses and all orders for this visit:  Iron deficiency anemia due to chronic blood loss  -     Clinic Appointment Request Follow Up; [facility]       Performance Status: Asymptomatic    I spent more than 30 minutes for the patient today, including face-to-face conversation, pre-visit preparation, post-visit orders, and others.   Layla Arguelles MD

## 2024-08-22 LAB
CELL COUNT (BLOOD): 7.14 X10*3/UL
CELL POPULATIONS: NORMAL
DIAGNOSIS: NORMAL
EPO SERPL-ACNC: 12 MU/ML (ref 4–27)
FLOW DIFFERENTIAL: NORMAL
FLOW TEST ORDERED: NORMAL
LAB TEST METHOD: NORMAL
NUMBER OF CELLS COLLECTED: NORMAL PER TUBE
PATH REPORT.TOTAL CANCER: NORMAL
SIGNATURE COMMENT: NORMAL
SPECIMEN VIABILITY: NORMAL

## 2024-08-23 LAB
ELECTRONICALLY SIGNED BY: NORMAL
MYELOID NGS RESULTS: NORMAL

## 2024-08-26 LAB
ELECTRONICALLY SIGNED BY: ABNORMAL
HFE GENE MUT TESTED BLD/T: ABNORMAL
HFE P.C282Y BLD/T QL: NORMAL
HFE P.H63D BLD/T QL: ABNORMAL

## 2024-08-27 ENCOUNTER — TELEPHONE (OUTPATIENT)
Dept: HEMATOLOGY/ONCOLOGY | Facility: CLINIC | Age: 74
End: 2024-08-27
Payer: COMMERCIAL

## 2024-08-27 DIAGNOSIS — D75.1 ERYTHROCYTOSIS: Primary | ICD-10-CM

## 2024-08-27 RX ORDER — HEPARIN SODIUM,PORCINE/PF 10 UNIT/ML
50 SYRINGE (ML) INTRAVENOUS AS NEEDED
OUTPATIENT
Start: 2024-09-10

## 2024-08-27 RX ORDER — FAMOTIDINE 10 MG/ML
20 INJECTION INTRAVENOUS ONCE AS NEEDED
OUTPATIENT
Start: 2024-09-10

## 2024-08-27 RX ORDER — DIPHENHYDRAMINE HYDROCHLORIDE 50 MG/ML
50 INJECTION INTRAMUSCULAR; INTRAVENOUS AS NEEDED
OUTPATIENT
Start: 2024-09-10

## 2024-08-27 RX ORDER — HEPARIN 100 UNIT/ML
500 SYRINGE INTRAVENOUS AS NEEDED
OUTPATIENT
Start: 2024-09-10

## 2024-08-27 RX ORDER — ALBUTEROL SULFATE 0.83 MG/ML
3 SOLUTION RESPIRATORY (INHALATION) AS NEEDED
OUTPATIENT
Start: 2024-09-10

## 2024-08-27 RX ORDER — EPINEPHRINE 0.3 MG/.3ML
0.3 INJECTION SUBCUTANEOUS EVERY 5 MIN PRN
OUTPATIENT
Start: 2024-09-10

## 2024-08-27 NOTE — TELEPHONE ENCOUNTER
Dr. Rich can you please call her with results.         Hemochromatosis H63D Result  Normal Heterozygous Abnormal    Hemochromatosis C282Y Result  Normal Normal

## 2024-09-09 DIAGNOSIS — D75.1 ERYTHROCYTOSIS: Primary | ICD-10-CM

## 2024-09-09 NOTE — TELEPHONE ENCOUNTER
Discharge Diagnosis  Hypoxia    Issues Requiring Follow-Up  Resolution of pneumonia  Wound care to legs etc.    Discharge Meds     Medication List      START taking these medications     azithromycin 250 mg tablet; Commonly known as: Zithromax; Take 1 tablet   (250 mg) by mouth once every 24 hours for 3 days.; Start taking on:   September 10, 2024   cefuroxime 500 mg tablet; Commonly known as: Ceftin; Take 1 tablet (500   mg) by mouth 2 times a day for 5 days.   oxygen gas therapy; Commonly known as: O2; Inhale 1 each once every 24   hours.   zinc oxide 40 % ointment ointment; Apply 1 Application topically 2 times   a day.     CHANGE how you take these medications     acetaminophen 325 mg tablet; Commonly known as: Tylenol; What changed:   Another medication with the same name was removed. Continue taking this   medication, and follow the directions you see here.   lidocaine 5 % patch; Commonly known as: Lidoderm; What changed: Another   medication with the same name was removed. Continue taking this   medication, and follow the directions you see here.     CONTINUE taking these medications     amiodarone 200 mg tablet; Commonly known as: Pacerone   Breo Ellipta 100-25 mcg/dose inhaler; Generic drug: fluticasone   furoate-vilanteroL   capsicum 0.075 % topical cream; Commonly known as: Zostrix   carvedilol 3.125 mg tablet; Commonly known as: Coreg   docusate sodium 100 mg capsule; Commonly known as: Colace   Dulcolax (bisacodyl) 10 mg suppository; Generic drug: bisacodyl   Eliquis 2.5 mg tablet; Generic drug: apixaban   finasteride 5 mg tablet; Commonly known as: Proscar   Flomax 0.4 mg 24 hr capsule; Generic drug: tamsulosin   gabapentin 300 mg capsule; Commonly known as: Neurontin   hyoscyamine 0.125 mg disintegrating tablet; Commonly known as: Anaspaz   levothyroxine 75 mcg tablet; Commonly known as: Synthroid, Levoxyl   magnesium hydroxide 400 mg/5 mL suspension; Commonly known as: Milk of   Magnesia   magnesium  Per gs-stop levothyroxine and we will follow up in May at her appointment. Patient has been notified    oxide 400 mg tablet; Commonly known as: Mag-Ox   melatonin 10 mg tablet   nitroglycerin 0.4 mg SL tablet; Commonly known as: Nitrostat   ondansetron 4 mg tablet; Commonly known as: Zofran   polyethylene glycol 17 gram packet; Commonly known as: Glycolax, Miralax   potassium chloride CR 10 mEq ER tablet; Commonly known as: Klor-Con   psyllium 3.4 gram packet; Commonly known as: Metamucil   simethicone 80 mg chewable tablet; Commonly known as: Mylicon   traZODone 50 mg tablet; Commonly known as: Desyrel     STOP taking these medications     furosemide 20 mg tablet; Commonly known as: Lasix   tissue resp fact-shark soto oil rectal ointment; Commonly known as:   Preparation H       Test Results Pending At Discharge  Pending Labs       Order Current Status    Extra Urine Gray Tube Collected (09/07/24 0621)    Urinalysis with Reflex Culture and Microscopic In process            Hospital Course   Mr. Concepcion is from a long term care facility, hx of chronic respiratory failure, copd, tierney (refusing cpap), dementia, PHN, Hypertension, chf, dm, atrial fib, admitted after developing hypoxia and hemoptysis and being sent for evaluation. Evaluation found him to have a possible pneumonia, as well as some volume overload. He was placed on empiric ATB, iv lasix as well and diuresed 4 liters of fluid while here. His vitals have been stable, he is afebrile. He has not been complaining of much pain at all during his stay. He does require assistance with meals. He has oxygen at the facility per H&P, currently he is only requiring 1 liter, and even that may be able to be discontinue soon. He is being discharged on several more days of antibiotics, as well as a slightly higher dose of lasix. Follow up with the physician on staff. He will remain a dnr/ml as well    Pertinent Physical Exam At Time of Discharge  He is alert. Slow verbally. Lungs with inspiratory crackles bilaterally, no wheezing, easy respirations. Heart is irregular,  controlled rate. Abdomen is less distended, not tender this am with bowel sounds. Dressings over wound on extremities. Pulses faint.     Outpatient Follow-Up  No future appointments.    32 minutes was spent on this discharge, completing med rec, performing exam and completing discharge paperwork    Kiana Huertas MD

## 2024-09-10 ENCOUNTER — INFUSION (OUTPATIENT)
Dept: HEMATOLOGY/ONCOLOGY | Facility: CLINIC | Age: 74
End: 2024-09-10
Payer: COMMERCIAL

## 2024-09-10 VITALS
RESPIRATION RATE: 17 BRPM | HEART RATE: 58 BPM | WEIGHT: 154.76 LBS | BODY MASS INDEX: 27.39 KG/M2 | SYSTOLIC BLOOD PRESSURE: 138 MMHG | OXYGEN SATURATION: 97 % | TEMPERATURE: 97 F | DIASTOLIC BLOOD PRESSURE: 59 MMHG

## 2024-09-10 DIAGNOSIS — D75.1 ERYTHROCYTOSIS: ICD-10-CM

## 2024-09-10 LAB
BASOPHILS # BLD AUTO: 0.03 X10*3/UL (ref 0–0.1)
BASOPHILS NFR BLD AUTO: 0.4 %
EOSINOPHIL # BLD AUTO: 0.06 X10*3/UL (ref 0–0.4)
EOSINOPHIL NFR BLD AUTO: 0.8 %
ERYTHROCYTE [DISTWIDTH] IN BLOOD BY AUTOMATED COUNT: 13.2 % (ref 11.5–14.5)
FERRITIN SERPL-MCNC: 220 NG/ML (ref 8–150)
HCT VFR BLD AUTO: 42.4 % (ref 36–46)
HGB BLD-MCNC: 14.6 G/DL (ref 12–16)
IMM GRANULOCYTES # BLD AUTO: 0.02 X10*3/UL (ref 0–0.5)
IMM GRANULOCYTES NFR BLD AUTO: 0.3 % (ref 0–0.9)
IRON SATN MFR SERPL: 24 % (ref 25–45)
IRON SERPL-MCNC: 106 UG/DL (ref 35–150)
LYMPHOCYTES # BLD AUTO: 3.41 X10*3/UL (ref 0.8–3)
LYMPHOCYTES NFR BLD AUTO: 47 %
MCH RBC QN AUTO: 32.3 PG (ref 26–34)
MCHC RBC AUTO-ENTMCNC: 34.4 G/DL (ref 32–36)
MCV RBC AUTO: 94 FL (ref 80–100)
MONOCYTES # BLD AUTO: 0.92 X10*3/UL (ref 0.05–0.8)
MONOCYTES NFR BLD AUTO: 12.7 %
NEUTROPHILS # BLD AUTO: 2.81 X10*3/UL (ref 1.6–5.5)
NEUTROPHILS NFR BLD AUTO: 38.8 %
NRBC BLD-RTO: 0 /100 WBCS (ref 0–0)
PLATELET # BLD AUTO: 218 X10*3/UL (ref 150–450)
RBC # BLD AUTO: 4.52 X10*6/UL (ref 4–5.2)
TIBC SERPL-MCNC: 435 UG/DL (ref 240–445)
UIBC SERPL-MCNC: 329 UG/DL (ref 110–370)
WBC # BLD AUTO: 7.3 X10*3/UL (ref 4.4–11.3)

## 2024-09-10 PROCEDURE — 85025 COMPLETE CBC W/AUTO DIFF WBC: CPT

## 2024-09-10 PROCEDURE — 82728 ASSAY OF FERRITIN: CPT | Mod: WESLAB

## 2024-09-10 PROCEDURE — 83540 ASSAY OF IRON: CPT | Mod: WESLAB

## 2024-09-10 PROCEDURE — 99195 PHLEBOTOMY: CPT

## 2024-09-10 ASSESSMENT — PAIN SCALES - GENERAL: PAINLEVEL: 7

## 2024-09-10 NOTE — PROGRESS NOTES
Patient presents to infusion today for first Phleb. RN provided verbal and written handout education to patient regarding after care of Phleb treatment. Pt tolerated treatment without incident. VSS, pt leaving in stable condition.

## 2024-09-16 DIAGNOSIS — E03.9 ACQUIRED HYPOTHYROIDISM: ICD-10-CM

## 2024-09-16 RX ORDER — LEVOTHYROXINE SODIUM 25 UG/1
25 TABLET ORAL DAILY
Qty: 30 TABLET | Refills: 0 | Status: SHIPPED | OUTPATIENT
Start: 2024-09-16 | End: 2025-09-16

## 2024-09-17 ENCOUNTER — APPOINTMENT (OUTPATIENT)
Dept: AUDIOLOGY | Facility: CLINIC | Age: 74
End: 2024-09-17
Payer: COMMERCIAL

## 2024-09-17 ENCOUNTER — APPOINTMENT (OUTPATIENT)
Dept: OTOLARYNGOLOGY | Facility: CLINIC | Age: 74
End: 2024-09-17
Payer: COMMERCIAL

## 2024-09-17 ENCOUNTER — TELEPHONE (OUTPATIENT)
Dept: PRIMARY CARE | Facility: CLINIC | Age: 74
End: 2024-09-17

## 2024-09-17 VITALS — HEIGHT: 63 IN | WEIGHT: 154 LBS | BODY MASS INDEX: 27.29 KG/M2

## 2024-09-17 DIAGNOSIS — H90.3 SENSORINEURAL HEARING LOSS (SNHL) OF BOTH EARS: Primary | ICD-10-CM

## 2024-09-17 DIAGNOSIS — J30.9 ALLERGIC RHINITIS, UNSPECIFIED SEASONALITY, UNSPECIFIED TRIGGER: ICD-10-CM

## 2024-09-17 DIAGNOSIS — H93.13 TINNITUS OF BOTH EARS: ICD-10-CM

## 2024-09-17 PROCEDURE — 99203 OFFICE O/P NEW LOW 30 MIN: CPT | Performed by: OTOLARYNGOLOGY

## 2024-09-17 PROCEDURE — 92550 TYMPANOMETRY & REFLEX THRESH: CPT | Performed by: AUDIOLOGIST

## 2024-09-17 PROCEDURE — 1159F MED LIST DOCD IN RCRD: CPT | Performed by: OTOLARYNGOLOGY

## 2024-09-17 PROCEDURE — 1036F TOBACCO NON-USER: CPT | Performed by: OTOLARYNGOLOGY

## 2024-09-17 PROCEDURE — 3008F BODY MASS INDEX DOCD: CPT | Performed by: OTOLARYNGOLOGY

## 2024-09-17 PROCEDURE — 92557 COMPREHENSIVE HEARING TEST: CPT | Performed by: AUDIOLOGIST

## 2024-09-17 NOTE — PROGRESS NOTES
Chief Complaint   Patient presents with    Hearing Problem     NP- DIFFICULTY HEARING, POSSIBLE FLUID IN EARS AND SINUS ISSUES, HAD AUDIO DONE AND WEARS SAVAGE       Date of Evaluation: 2024   HPI  Mckenna Hardin is a 74 y.o. female with a history of bilateral hearing loss and hearing aid use.  Many years ago she had a left tympanic membrane perforation and a right myringotomy and tube.  She has relatively new hearing aids.  She has mild rhinorrhea.  Her audiogram shows bilateral severe sensorineural hearing loss with fair word recognition.  She has had a lot of noise exposure through the 70s with loud rock bands.       Past Medical History:   Diagnosis Date    Atypical ductal hyperplasia, breast     left breast    Coronary artery disease       Past Surgical History:   Procedure Laterality Date    BREAST BIOPSY Left      SECTION, CLASSIC      HIP SURGERY            Medications:   Current Outpatient Medications   Medication Instructions    albuterol 90 mcg/actuation inhaler 1-2 puffs, inhalation, Every 4 hours PRN    ascorbic acid (VITAMIN C) 1,000 mg, oral, Daily, For 30 days    aspirin 81 mg, oral, Daily, For 30 days    b complex vitamins capsule 1 capsule, oral, Daily    blood-glucose meter (OneTouch Ultra2 Meter) misc 1 kit, miscellaneous, Daily    cholecalciferol (VITAMIN D3) 1,000 Units, oral, Daily    divalproex (DEPAKOTE ER) 500 mg, oral, Daily, For 30 days    elderberry fruit 350 mg capsule oral    fluticasone (Flonase Sensimist) 27.5 mcg/actuation nasal spray 1 spray, Each Nostril, Daily RT    fluticasone (Flonase) 50 mcg/actuation nasal spray 1 spray, Each Nostril, Daily    levothyroxine (SYNTHROID, LEVOXYL) 25 mcg, oral, Daily, Take on an empty stomach at the same time each day, either 30 to 60 minutes prior to breakfast    lidocaine 4 % patch As directed externally    loratadine (CLARITIN) 10 mg, oral, Daily PRN    magnesium citrate 100 mg tablet oral    milk thistle 175 mg, oral, Daily    omega-3  "fatty acids (FISH OIL CONCENTRATE ORAL) oral    OneTouch Delica Plus Lancet 33 gauge misc     OneTouch Ultra Test strip TEST ONCE DAILY    rosuvastatin (CRESTOR) 5 mg, oral, Daily    spironolactone (ALDACTONE) 50 mg, oral, Daily        Allergies:  Allergies   Allergen Reactions    Codeine Unknown     Unable to recall sideffect    Haloperidol Unknown     hyper    Thiazides Rash        Physical Exam:  Last Recorded Vitals  Height 1.6 m (5' 3\"), weight 69.9 kg (154 lb).  []General appearance: Well-developed, well-nourished in no acute distress, conversant with normal voice quality    Head/face: No erythema or edema or facial tenderness, and normal facial nerve function bilaterally    External ear: Clear external auditory canals with normal pinnae  Tube status: N/A  Middle ear: Tympanic membranes intact and mobile, middle ears normal.  Opacity of the tympanic membrane with scarring.  No active disease.  No middle ear fluid  Tympanic membrane perforation: N/A  Mastoid bowl: N/A  Hearing: Normal conversational awareness at normal speech thresholds    Nose visualized using: Anterior rhinoscopy  Nasal dorsum: Nontraumatic midline appearance  Septum: Midline, nonobstructing  Inferior turbinates: Normal, pink  Secretions: Dry    Oral cavity and oropharynx: Normal  Teeth: Good condition  Floor of mouth: without lesions  Palate: Normal hard palate, soft palate and uvula  Oropharynx: Clear, no lesions present  Buccal mucosa: Normal without masses or lesions  Lips: Normal    Nasopharynx: Inadequate mirror exam secondary to gag/anatomy    Neck:  Salivary glands: Normal bilateral parotid and submandibular glands by inspection and palpation.  Non-thyroid masses: No palpable masses or significant lymphadenopathy  Trachea: Midline  Thyroid: No thyromegaly or palpable nodules  Temporomandibular joint: Nontender  Cervical range of motion: Normal    Neurologic exam: Alert and oriented x3, appropriate affect.  Cranial nerves II-XII normal " bilaterally  Extraocular movement: Extraocular movement intact, normal gaze alignment        Mckenna was seen today for hearing problem.  Diagnoses and all orders for this visit:  Sensorineural hearing loss (SNHL) of both ears (Primary)  Allergic rhinitis, unspecified seasonality, unspecified trigger       PLAN  At this point there is no conductive component to her hearing loss.  No middle ear effusion.  Normal tympanometry.  She will continue to work with improving her hearing aids.  Recheck with audiogram in 1 year    Hima Sidhu MD

## 2024-09-23 NOTE — PROGRESS NOTES
AUDIOLOGY ADULT AUDIOMETRIC EVALUATION    Name:  Mckenna Hardin  :  1950  Age:  74 y.o.  Date of Evaluation:  2024    Reason for visit: Ms. Hardin is seen in the clinic today at the request of Hima Sidhu MD in otolaryngology for an audiologic evaluation.     HISTORY  The patient reported that she purchased hearing aids this past year.  Occasioal bilateral tinnitus and dizziness were reported.      EVALUATION  See scanned audiogram: “Media” > “Audiology Report”.      RESULTS  Otoscopic Evaluation:  Right Ear: clear ear canal  Left Ear: clear ear canal    Immittance Measures:  Tympanometry:  Right Ear: Type A, normal tympanic membrane mobility with normal middle ear pressure   Left Ear: Type A, normal tympanic membrane mobility with normal middle ear pressure     Acoustic Reflexes:  Ipsilateral Right Ear: Absent at 500-4000 Hz   Ipsilateral Left Ear: Present at normal levels at 1000 and 2000 Hz, absent at 500 and 4000 Hz   Contralateral Right Ear: did not evaluate  Contralateral Left Ear: did not evaluate    Distortion Product Otoacoustic Emissions (DPOAEs):  Right Ear: did not evaluate   Left Ear: did not evaluate     Audiometry:  Test Technique and Reliability:   Standard audiometry via insert earphones/supra-aural headphones. Reliability is good.    Pure tone air and bone conduction audiometry:  Right Ear: moderate to mild sensorineural hearing loss   Left Ear: moderate to mild sensorineural hearing loss     Speech Audiometry (Word Recognition Scores):   Right Ear: Excellent, 92% in quiet at an elevated presentation level  Left Ear:  Excellent, 96% in quiet at an elevated presentation level    IMPRESSIONS  Results of today's audiometric evaluation revealed a bilateral sensorineural hearing loss.   The presence of acoustic reflexes within normal intensity limits is consistent with normal middle ear and brainstem function, and suggests that auditory sensitivity is not significantly impaired.  An elevated or absent acoustic reflex threshold is consistent with a middle ear disorder, hearing loss in the stimulated ear, and/or interruption of neural innervation of the stapedius muscle.     RECOMMENDATIONS  - Follow up with otolaryngology today as scheduled.  - Annual audiologic evaluation, sooner if an acute change is noted.  - Continued hearing aid use.  - Follow-up with audiology annually for routine hearing aid maintenance, sooner if questions/problems arise.    PATIENT EDUCATION  Discussed results, impressions and recommendations with the patient. Questions were addressed and the patient was encouraged to contact our office should concerns arise.    Time for this encounter: 9:30-10:00    Tiffanie Castellanos M.A., CCC-A   Licensed Audiologist

## 2024-10-10 DIAGNOSIS — E03.9 ACQUIRED HYPOTHYROIDISM: ICD-10-CM

## 2024-10-10 RX ORDER — LEVOTHYROXINE SODIUM 25 UG/1
TABLET ORAL
Qty: 15 TABLET | Refills: 0 | Status: SHIPPED | OUTPATIENT
Start: 2024-10-10 | End: 2024-10-10

## 2024-10-10 RX ORDER — LEVOTHYROXINE SODIUM 25 UG/1
25 TABLET ORAL DAILY
Qty: 15 TABLET | Refills: 0 | Status: SHIPPED | OUTPATIENT
Start: 2024-10-10

## 2024-10-15 ENCOUNTER — TELEPHONE (OUTPATIENT)
Dept: PRIMARY CARE | Facility: CLINIC | Age: 74
End: 2024-10-15

## 2024-10-22 ENCOUNTER — APPOINTMENT (OUTPATIENT)
Dept: PRIMARY CARE | Facility: CLINIC | Age: 74
End: 2024-10-22
Payer: COMMERCIAL

## 2024-10-22 VITALS
WEIGHT: 153 LBS | OXYGEN SATURATION: 98 % | HEART RATE: 64 BPM | HEIGHT: 63 IN | RESPIRATION RATE: 20 BRPM | SYSTOLIC BLOOD PRESSURE: 128 MMHG | DIASTOLIC BLOOD PRESSURE: 80 MMHG | BODY MASS INDEX: 27.11 KG/M2

## 2024-10-22 DIAGNOSIS — I25.118 CORONARY ARTERY DISEASE INVOLVING NATIVE CORONARY ARTERY OF NATIVE HEART WITH OTHER FORM OF ANGINA PECTORIS: ICD-10-CM

## 2024-10-22 DIAGNOSIS — F31.9 BIPOLAR I DISORDER (MULTI): ICD-10-CM

## 2024-10-22 DIAGNOSIS — R73.9 HYPERGLYCEMIA: ICD-10-CM

## 2024-10-22 DIAGNOSIS — K76.0 NONALCOHOLIC HEPATOSTEATOSIS: Primary | ICD-10-CM

## 2024-10-22 DIAGNOSIS — E78.00 HYPERCHOLESTEROLEMIA: ICD-10-CM

## 2024-10-22 DIAGNOSIS — R79.89 LFTS ABNORMAL: ICD-10-CM

## 2024-10-22 DIAGNOSIS — E03.9 ACQUIRED HYPOTHYROIDISM: ICD-10-CM

## 2024-10-22 DIAGNOSIS — R60.0 BILATERAL LOWER EXTREMITY EDEMA: ICD-10-CM

## 2024-10-22 PROCEDURE — 99214 OFFICE O/P EST MOD 30 MIN: CPT | Performed by: INTERNAL MEDICINE

## 2024-10-22 PROCEDURE — 3074F SYST BP LT 130 MM HG: CPT | Performed by: INTERNAL MEDICINE

## 2024-10-22 PROCEDURE — 1159F MED LIST DOCD IN RCRD: CPT | Performed by: INTERNAL MEDICINE

## 2024-10-22 PROCEDURE — 3008F BODY MASS INDEX DOCD: CPT | Performed by: INTERNAL MEDICINE

## 2024-10-22 PROCEDURE — G2211 COMPLEX E/M VISIT ADD ON: HCPCS | Performed by: INTERNAL MEDICINE

## 2024-10-22 PROCEDURE — 1036F TOBACCO NON-USER: CPT | Performed by: INTERNAL MEDICINE

## 2024-10-22 PROCEDURE — 3079F DIAST BP 80-89 MM HG: CPT | Performed by: INTERNAL MEDICINE

## 2024-10-22 RX ORDER — LEVOTHYROXINE SODIUM 25 UG/1
25 TABLET ORAL DAILY
Qty: 90 TABLET | Refills: 0 | Status: SHIPPED | OUTPATIENT
Start: 2024-10-22

## 2024-10-22 ASSESSMENT — ENCOUNTER SYMPTOMS
CARDIOVASCULAR NEGATIVE: 1
ALLERGIC/IMMUNOLOGIC NEGATIVE: 1
HEMATOLOGIC/LYMPHATIC NEGATIVE: 1
MUSCULOSKELETAL NEGATIVE: 1
EYES NEGATIVE: 1
PSYCHIATRIC NEGATIVE: 1
GASTROINTESTINAL NEGATIVE: 1
RESPIRATORY NEGATIVE: 1
NEUROLOGICAL NEGATIVE: 1
ENDOCRINE NEGATIVE: 1
CONSTITUTIONAL NEGATIVE: 1

## 2024-10-22 NOTE — ASSESSMENT & PLAN NOTE
Fatty liver due to metabolic syndrome and Educate extensively low carbohydrate diet AND LOW FAT DIET AND increase in exercise activity  and weight loss program and monitor HbA1C and glucose levels and consider Metformin 500 mg BID with meals

## 2024-10-22 NOTE — ASSESSMENT & PLAN NOTE
Monitor liver function and educate diet Educate extensively low carbohydrate diet AND LOW FAT DIET AND increase in exercise activity doubt the diagnosis of hemochromatosis

## 2024-10-22 NOTE — PROGRESS NOTES
"Subjective   Patient ID: Mckenna Hardin is a 74 y.o. female who presents for Follow-up (Follow up).    HPI     Review of Systems   Constitutional: Negative.    HENT: Negative.     Eyes: Negative.    Respiratory: Negative.     Cardiovascular: Negative.    Gastrointestinal: Negative.    Endocrine: Negative.    Musculoskeletal: Negative.    Skin: Negative.    Allergic/Immunologic: Negative.    Neurological: Negative.    Hematological: Negative.    Psychiatric/Behavioral: Negative.     All other systems reviewed and are negative.      Objective   Pulse 64   Resp 20   Ht 1.6 m (5' 3\")   Wt 69.4 kg (153 lb)   SpO2 98%   BMI 27.10 kg/m²   Blood pressure 128/80, pulse 64, resp. rate 20, height 1.6 m (5' 3\"), weight 69.4 kg (153 lb), SpO2 98%.   Physical Exam  Vitals and nursing note reviewed.   Constitutional:       Appearance: Normal appearance.   HENT:      Head: Normocephalic and atraumatic.      Right Ear: Tympanic membrane, ear canal and external ear normal.      Left Ear: Tympanic membrane, ear canal and external ear normal. There is no impacted cerumen.      Nose: Nose normal.      Mouth/Throat:      Mouth: Mucous membranes are moist.      Pharynx: Oropharynx is clear.   Eyes:      Extraocular Movements: Extraocular movements intact.      Conjunctiva/sclera: Conjunctivae normal.      Pupils: Pupils are equal, round, and reactive to light.   Cardiovascular:      Rate and Rhythm: Normal rate and regular rhythm.      Pulses: Normal pulses.      Heart sounds: Normal heart sounds. No murmur heard.  Pulmonary:      Effort: Pulmonary effort is normal. No respiratory distress.      Breath sounds: Normal breath sounds. No stridor. No wheezing, rhonchi or rales.   Chest:      Chest wall: No tenderness.   Abdominal:      General: Abdomen is flat. Bowel sounds are normal. There is no distension.      Palpations: Abdomen is soft. There is no mass.      Tenderness: There is no abdominal tenderness. There is no right CVA " tenderness, left CVA tenderness, guarding or rebound.      Hernia: No hernia is present.   Musculoskeletal:         General: Normal range of motion.      Cervical back: Normal range of motion and neck supple.   Skin:     General: Skin is warm.      Capillary Refill: Capillary refill takes less than 2 seconds.   Neurological:      General: No focal deficit present.      Mental Status: She is alert.      Cranial Nerves: No cranial nerve deficit.      Sensory: No sensory deficit.      Motor: No weakness.      Coordination: Coordination normal.      Gait: Gait normal.      Deep Tendon Reflexes: Reflexes normal.   Psychiatric:         Mood and Affect: Mood normal.         Behavior: Behavior normal. Behavior is cooperative.         Thought Content: Thought content normal.         Judgment: Judgment normal.         Assessment/Plan   Problem List Items Addressed This Visit             ICD-10-CM    Bilateral lower extremity edema R60.0     Edema - and leg swelling dur to venous insufficiency - responding to low dose Furosemide and recommend: leg elevation and compression stockings -          Bipolar I disorder (Multi) F31.9     Bipolar disoder and follows with psych         CAD (coronary artery disease) I25.10     CAD-coronary artery disease-patient has a history of myocardial infarction/stent placed in stenosed coronary arteries. Target LDL should be below 70 milligrams per deciliter. Reviewed EKG which shows no significant changes. Follows with his cardiologist/  ???He needs to call us with any new symptoms of angina or shortness of breath. Last stress test was/last coronary catheterization was/. Need to address risk factors BioCore controlling cholesterol blood pressure and diabetes. Educate extensively diet. Patient needs to follow in rehabilitation/mild exercises daily.            LFTs abnormal R79.89     Fatty liver due to metabolic syndrome and Educate extensively low carbohydrate diet AND LOW FAT DIET AND increase  in exercise activity  and weight loss program and monitor HbA1C and glucose levels and consider Metformin 500 mg BID with meals            Nonalcoholic hepatosteatosis - Primary K76.0     Monitor liver function and educate diet Educate extensively low carbohydrate diet AND LOW FAT DIET AND increase in exercise activity          Hyperglycemia R73.9     Educate extensively low carbohydrate diet AND LOW FAT DIET AND increase in exercise activity  and weight loss program and monitor HbA1C and glucose levels and consider Metformin 500 mg BID with meals            Acquired hypothyroidism E03.9     Hypothyroidism - Symptoms not well/controlled (weight gain, fatigue, constipation, cold intolerance). Check TSH re-start low dose of Synthroid brand of 25 mcg/qD.          Relevant Medications    levothyroxine (Synthroid, Levoxyl) 25 mcg tablet    Hypercholesterolemia E78.00     Hypercholesterolemia - Monitor lipid profile and educate patient upon risks of high cholesterol and targets. Educate diet and change in lifestyle and increase in exercises - Refill:   and educate compliance with medication and diet.              Bilateral lower extremity edema R60.0        Edema - and leg swelling dur to venous insufficiency - responding to low dose Furosemide and recommend: leg elevation and com            Relevant Orders     Compression Stockings 18-30 mmHg     CAD (coronary artery disease) I25.10       CAD-coronary artery disease-patient has a history of myocardial infarction/stent placed in stenosed coronary arteries. Target LDL should be below 70 milligrams per deciliter. Reviewed EKG which shows no significant changes. Follows with his cardiologist/  ???He needs to call us with any new symptoms of angina or shortness of breath. Last stress test was/last coronary catheterization was/. Need to address risk factors BioCore controlling cholesterol blood pressure and diabetes. Educate extensively diet. Patient needs to follow in  rehabilitation/mild exercises daily.              Leg swelling - Primary M79.89       Edema - and leg swelling dur to venous insufficiency - responding to low dose Furosemide and recommend: leg elevation and compression stockings - increase the Spironolactone to 50 mg daily            Relevant Medications     spironolactone (Aldactone) 50 mg tablet     Mixed hyperlipidemia E78.2       Hypercholesterolemia - Monitor lipid profile and educate patient upon risks of high cholesterol and targets. Educate diet and change in lifestyle and increase in exercises - Refill: rosuvastatin   and educate compliance with medication and diet.             Nonalcoholic hepatosteatosis K76.0       Monitor liver function and educate diet Educate extensively low carbohydrate diet AND LOW FAT DIET AND increase in exercise activity   \           Acquired hypothyroidism E03.9       Hypothyroidism - Symptoms not well/controlled (weight gain, fatigue, constipation, cold intolerance). Check TSH  re-start low  dose of Synthroid brand of  25 mcg/qD.            Relevant Medications     levothyroxine (Synthroid, Levoxyl) 25 mcg tablet     Other cirrhosis of liver (Multi) K74.69       Cirrhosis - of the liver - monitor liver function and monitor Viral hepatitis and no history of ETOH abuse and possible Depalote effect??? Refer to liver specialist /GI                         Immunizations/Injections      very important  Immunizations from outside sources need reconciliation.      Flu vaccine (IIV4), preservative free *Check age/dose*12/7/2015  Flu vaccine, quadrivalent, high-dose, preservative free, age 65y+ (FLUZONE)10/5/2022, 10/5/2021, 9/21/2020  Flu vaccine, trivalent, preservative free, HIGH-DOSE, age 65y+ (Fluzone)8/31/2017  Flu vaccine, trivalent, preservative free, age 6 months and greater (Fluarix/Fluzone/Flulaval)9/28/2016  Influenza, Seasonal, Quadrivalent, Nggxedjyfk90/2/2023  Influenza, Bvggsfuxkvl12/12/2012, 10/23/2011  Influenza,  seasonal, hqgszrhelz64/15/2014, 11/2/2013  Influenza, trivalent, adjuvanted9/6/2019, 10/3/2018  Moderna COVID-19 vaccine, bivalent, blue cap/gray label *Check age/dose*9/9/2022  Pfizer COVID-19 vaccine, 12 years and older, (30mcg/0.3mL) (Comirnaty)10/2/2023  Pneumococcal conjugate vaccine, 13-valent (PREVNAR 13)10/3/2018  Pneumococcal polysaccharide vaccine, 23-valent, age 2 years and older (PNEUMOVAX 23)10/7/2019, 4/7/2010  Tdap vaccine, age 7 year and older (BOOSTRIX, ADACEL)4/7/2010

## 2024-11-08 ENCOUNTER — TELEPHONE (OUTPATIENT)
Dept: PRIMARY CARE | Facility: CLINIC | Age: 74
End: 2024-11-08
Payer: COMMERCIAL

## 2024-11-14 ENCOUNTER — TELEPHONE (OUTPATIENT)
Dept: PRIMARY CARE | Facility: CLINIC | Age: 74
End: 2024-11-14
Payer: COMMERCIAL

## 2024-11-14 NOTE — TELEPHONE ENCOUNTER
PATIENT SAID 3RD TIME CALLING WOULD LIKE RECORDS REVIEWED REGARDING HER CONDITION HAS ANOTHER DR SHE IS SEEING THAT SAYS PER HER RESULTS SHE A CONDITION PLEASE CALL [phone]

## 2024-11-18 ENCOUNTER — TELEPHONE (OUTPATIENT)
Dept: HEMATOLOGY/ONCOLOGY | Facility: CLINIC | Age: 74
End: 2024-11-18
Payer: COMMERCIAL

## 2024-11-19 ENCOUNTER — APPOINTMENT (OUTPATIENT)
Dept: PHYSICAL THERAPY | Facility: HOSPITAL | Age: 74
End: 2024-11-19
Payer: COMMERCIAL

## 2024-11-19 NOTE — TELEPHONE ENCOUNTER
Mckenna stated Dr. Martines told her that she doesnot have Hemachromatosis.  I read her the result of her HFE mutation test.  She is going to see him Thursday and will clarify with him.  She will call if she wants to cancel her January appt with Dr. Rich.

## 2024-11-20 ENCOUNTER — APPOINTMENT (OUTPATIENT)
Dept: HEMATOLOGY/ONCOLOGY | Facility: CLINIC | Age: 74
End: 2024-11-20
Payer: COMMERCIAL

## 2024-11-20 DIAGNOSIS — D50.0 IRON DEFICIENCY ANEMIA DUE TO CHRONIC BLOOD LOSS: Primary | ICD-10-CM

## 2024-11-21 ENCOUNTER — APPOINTMENT (OUTPATIENT)
Dept: PRIMARY CARE | Facility: CLINIC | Age: 74
End: 2024-11-21
Payer: COMMERCIAL

## 2024-11-21 VITALS
SYSTOLIC BLOOD PRESSURE: 130 MMHG | DIASTOLIC BLOOD PRESSURE: 80 MMHG | BODY MASS INDEX: 27.11 KG/M2 | OXYGEN SATURATION: 99 % | HEIGHT: 63 IN | HEART RATE: 64 BPM | RESPIRATION RATE: 20 BRPM | WEIGHT: 153 LBS

## 2024-11-21 DIAGNOSIS — E03.9 ACQUIRED HYPOTHYROIDISM: ICD-10-CM

## 2024-11-21 DIAGNOSIS — J02.9 PHARYNGITIS, UNSPECIFIED ETIOLOGY: ICD-10-CM

## 2024-11-21 DIAGNOSIS — I10 PRIMARY HYPERTENSION: Primary | ICD-10-CM

## 2024-11-21 DIAGNOSIS — J45.909 MODERATE ASTHMA WITHOUT COMPLICATION, UNSPECIFIED WHETHER PERSISTENT (HHS-HCC): ICD-10-CM

## 2024-11-21 DIAGNOSIS — J98.01 COUGH DUE TO BRONCHOSPASM: ICD-10-CM

## 2024-11-21 PROCEDURE — 99214 OFFICE O/P EST MOD 30 MIN: CPT | Performed by: INTERNAL MEDICINE

## 2024-11-21 PROCEDURE — G2211 COMPLEX E/M VISIT ADD ON: HCPCS | Performed by: INTERNAL MEDICINE

## 2024-11-21 PROCEDURE — 3075F SYST BP GE 130 - 139MM HG: CPT | Performed by: INTERNAL MEDICINE

## 2024-11-21 PROCEDURE — 1036F TOBACCO NON-USER: CPT | Performed by: INTERNAL MEDICINE

## 2024-11-21 PROCEDURE — 3079F DIAST BP 80-89 MM HG: CPT | Performed by: INTERNAL MEDICINE

## 2024-11-21 PROCEDURE — 3008F BODY MASS INDEX DOCD: CPT | Performed by: INTERNAL MEDICINE

## 2024-11-21 PROCEDURE — 1159F MED LIST DOCD IN RCRD: CPT | Performed by: INTERNAL MEDICINE

## 2024-11-21 RX ORDER — AZITHROMYCIN 500 MG/1
500 TABLET, FILM COATED ORAL DAILY
Qty: 5 TABLET | Refills: 0 | Status: SHIPPED | OUTPATIENT
Start: 2024-11-21 | End: 2024-11-26

## 2024-11-21 RX ORDER — BENZONATATE 200 MG/1
200 CAPSULE ORAL 3 TIMES DAILY PRN
Qty: 42 CAPSULE | Refills: 0 | Status: SHIPPED | OUTPATIENT
Start: 2024-11-21 | End: 2024-12-21

## 2024-11-21 RX ORDER — MINERAL OIL
180 ENEMA (ML) RECTAL DAILY
Qty: 30 TABLET | Refills: 5 | Status: SHIPPED | OUTPATIENT
Start: 2024-11-21 | End: 2025-11-21

## 2024-11-21 ASSESSMENT — ENCOUNTER SYMPTOMS
ALLERGIC/IMMUNOLOGIC NEGATIVE: 1
HEMATOLOGIC/LYMPHATIC NEGATIVE: 1
ENDOCRINE NEGATIVE: 1
MUSCULOSKELETAL NEGATIVE: 1
RESPIRATORY NEGATIVE: 1
GASTROINTESTINAL NEGATIVE: 1
EYES NEGATIVE: 1
PSYCHIATRIC NEGATIVE: 1
CARDIOVASCULAR NEGATIVE: 1
CONSTITUTIONAL NEGATIVE: 1
NEUROLOGICAL NEGATIVE: 1

## 2024-11-21 NOTE — ASSESSMENT & PLAN NOTE
Bronchitis with wheezing                                           Recommend:                                                                                                      mild  Pharyngitis,                                                                                                                                                                               Start:  Azithromycin 500 mg daily for 6 days  -                                                                                                                                                             Asthma/Cough        Allegra 180mg qD vs. Claritin 10 mg qD and Mucinex                                                                                                                                             Cough  - start Prednisone 10 mg II BID for 3 days and II qD for 3 days and I qD for 3 days -  Avoid cold exposure and take vitamins C 500 and B complex  qD   Hycodan syrup one tea spoon qHS 4 oz.     Sinusitis - allergic vs. infectious - start Allegra and Flonase I BID and Azithromycin 500 mg qD for 6 days. and avoid cold exposure.

## 2024-11-21 NOTE — PROGRESS NOTES
"Subjective   Patient ID: Mckenna Hardin is a 74 y.o. female who presents for Follow-up (Follow up on test results ). Sore throat and fever of 100 and chills and mild cough and sinus congestion and postnasal drip since yesterday     HPI     Review of Systems   Constitutional: Negative.    HENT: Negative.     Eyes: Negative.    Respiratory: Negative.     Cardiovascular: Negative.    Gastrointestinal: Negative.    Endocrine: Negative.    Musculoskeletal: Negative.    Skin: Negative.    Allergic/Immunologic: Negative.    Neurological: Negative.    Hematological: Negative.    Psychiatric/Behavioral: Negative.     All other systems reviewed and are negative.      Objective   Pulse 64   Resp 20   Ht 1.6 m (5' 3\")   Wt 69.4 kg (153 lb)   SpO2 99%   BMI 27.10 kg/m²   Blood pressure 130/80, pulse 64, resp. rate 20, height 1.6 m (5' 3\"), weight 69.4 kg (153 lb), SpO2 99%.   Physical Exam  Vitals and nursing note reviewed.   Constitutional:       Appearance: Normal appearance.   HENT:      Head: Normocephalic and atraumatic.      Right Ear: Tympanic membrane, ear canal and external ear normal.      Left Ear: Tympanic membrane, ear canal and external ear normal. There is no impacted cerumen.      Nose: Nose normal.      Mouth/Throat:      Mouth: Mucous membranes are moist.      Pharynx: Oropharynx is clear.   Eyes:      Extraocular Movements: Extraocular movements intact.      Conjunctiva/sclera: Conjunctivae normal.      Pupils: Pupils are equal, round, and reactive to light.   Cardiovascular:      Rate and Rhythm: Normal rate and regular rhythm.      Pulses: Normal pulses.      Heart sounds: Normal heart sounds. No murmur heard.  Pulmonary:      Effort: Pulmonary effort is normal. No respiratory distress.      Breath sounds: Normal breath sounds. No stridor. No wheezing, rhonchi or rales.   Chest:      Chest wall: No tenderness.   Abdominal:      General: Abdomen is flat. Bowel sounds are normal. There is no distension.     "  Palpations: Abdomen is soft. There is no mass.      Tenderness: There is no abdominal tenderness. There is no right CVA tenderness, left CVA tenderness, guarding or rebound.      Hernia: No hernia is present.   Musculoskeletal:         General: Normal range of motion.      Cervical back: Normal range of motion and neck supple.   Skin:     General: Skin is warm.      Capillary Refill: Capillary refill takes less than 2 seconds.   Neurological:      General: No focal deficit present.      Mental Status: She is alert.      Cranial Nerves: No cranial nerve deficit.      Sensory: No sensory deficit.      Motor: No weakness.      Coordination: Coordination normal.      Gait: Gait normal.      Deep Tendon Reflexes: Reflexes normal.   Psychiatric:         Mood and Affect: Mood normal.         Behavior: Behavior normal. Behavior is cooperative.         Thought Content: Thought content normal.         Judgment: Judgment normal.         Assessment/Plan   Problem List Items Addressed This Visit             ICD-10-CM    Asthma J45.909     Bronchitis with wheezing                                           Recommend:                                                                                                      mild  Pharyngitis,                                                                                                                                                                               Start:  Azithromycin 500 mg daily for 6 days  -                                                                                                                                                             Asthma/Cough        Allegra 180mg qD vs. Claritin 10 mg qD and Mucinex                                                                                                                                             Cough  - start Prednisone 10 mg II BID for 3 days and II qD for 3 days and I qD for 3 days -  Avoid cold  exposure and take vitamins C 500 and B complex  qD   Hycodan syrup one tea spoon qHS 4 oz.     Sinusitis - allergic vs. infectious - start Allegra and Flonase I BID and Azithromycin 500 mg qD for 6 days. and avoid cold exposure.           Primary hypertension - Primary I10     HTN - hypertension well/controlled .Target BP < 130/80 achieved. Educate low salt diet and exercise with weight loss. Educate home self monitoring and diary keeping. Educate risks of elevate blood pressure and benefits of prompt treatment. Refill the Spironolactone          Acquired hypothyroidism E03.9     Hypothyroidism - Symptoms not well/controlled (weight gain, fatigue, constipation, cold intolerance). Check TSH re-start low dose of Synthroid brand of 25 mcg/qD.          Pharyngitis J02.9    Cough due to bronchospasm J98.01     Bronchitis with wheezing                                           Recommend:                                                                                                      mild  Pharyngitis,                                                                                                                                                                               Start: Doxycycline 100 mg BID for 20 dyas -  Azithromycin 500 mg daily for 6 days  -                                                                                                                                                             Asthma/      Allegra 180mg qD vs. Claritin 10 mg qD and Mucinex                                                                                                                                             Cough  - start Prednisone 10 mg II BID for 3 days and II qD for 3 days and I qD for 3 days -  Avoid cold exposure and take vitamins C 500 and B complex  qD   Hycodan syrup one tea spoon qHS 4 oz.     Sinusitis - allergic vs. infectious - start Allegra and Flonase I BID and Azithromycin 500 mg qD for 6  days. and avoid cold exposure.           Relevant Medications    azithromycin (Zithromax) 500 mg tablet    benzonatate (Tessalon) 200 mg capsule    fexofenadine (Allegra) 180 mg tablet

## 2024-11-21 NOTE — ASSESSMENT & PLAN NOTE
Bronchitis with wheezing                                           Recommend:                                                                                                      mild  Pharyngitis,                                                                                                                                                                               Start: Doxycycline 100 mg BID for 20 dyas -  Azithromycin 500 mg daily for 6 days  -                                                                                                                                                             Asthma/      Allegra 180mg qD vs. Claritin 10 mg qD and Mucinex                                                                                                                                             Cough  - start Prednisone 10 mg II BID for 3 days and II qD for 3 days and I qD for 3 days -  Avoid cold exposure and take vitamins C 500 and B complex  qD   Hycodan syrup one tea spoon qHS 4 oz.     Sinusitis - allergic vs. infectious - start Allegra and Flonase I BID and Azithromycin 500 mg qD for 6 days. and avoid cold exposure.

## 2024-11-21 NOTE — ASSESSMENT & PLAN NOTE
HTN - hypertension well/controlled .Target BP < 130/80 achieved. Educate low salt diet and exercise with weight loss. Educate home self monitoring and diary keeping. Educate risks of elevate blood pressure and benefits of prompt treatment. Refill the Spironolactone

## 2024-11-26 DIAGNOSIS — J01.90 ACUTE NON-RECURRENT SINUSITIS, UNSPECIFIED LOCATION: ICD-10-CM

## 2024-11-26 RX ORDER — DOXYCYCLINE 100 MG/1
100 CAPSULE ORAL 2 TIMES DAILY
Qty: 28 CAPSULE | Refills: 0 | Status: SHIPPED | OUTPATIENT
Start: 2024-11-26 | End: 2024-12-10

## 2024-12-26 ENCOUNTER — LAB (OUTPATIENT)
Dept: LAB | Facility: LAB | Age: 74
End: 2024-12-26
Payer: COMMERCIAL

## 2024-12-26 DIAGNOSIS — D50.0 IRON DEFICIENCY ANEMIA DUE TO CHRONIC BLOOD LOSS: ICD-10-CM

## 2025-01-13 ENCOUNTER — APPOINTMENT (OUTPATIENT)
Dept: HEMATOLOGY/ONCOLOGY | Facility: CLINIC | Age: 75
End: 2025-01-13
Payer: COMMERCIAL

## 2025-01-17 ENCOUNTER — APPOINTMENT (OUTPATIENT)
Dept: HEMATOLOGY/ONCOLOGY | Facility: CLINIC | Age: 75
End: 2025-01-17
Payer: COMMERCIAL

## 2025-01-17 DIAGNOSIS — E03.9 ACQUIRED HYPOTHYROIDISM: ICD-10-CM

## 2025-01-17 RX ORDER — LEVOTHYROXINE SODIUM 25 UG/1
25 TABLET ORAL DAILY
Qty: 90 TABLET | Refills: 0 | Status: SHIPPED | OUTPATIENT
Start: 2025-01-17

## 2025-01-23 ENCOUNTER — APPOINTMENT (OUTPATIENT)
Dept: PRIMARY CARE | Facility: CLINIC | Age: 75
End: 2025-01-23
Payer: COMMERCIAL

## 2025-02-03 ENCOUNTER — APPOINTMENT (OUTPATIENT)
Dept: RADIOLOGY | Facility: HOSPITAL | Age: 75
End: 2025-02-03
Payer: MEDICARE

## 2025-02-03 ENCOUNTER — HOSPITAL ENCOUNTER (EMERGENCY)
Facility: HOSPITAL | Age: 75
Discharge: HOME | End: 2025-02-03
Attending: EMERGENCY MEDICINE
Payer: MEDICARE

## 2025-02-03 VITALS
RESPIRATION RATE: 17 BRPM | SYSTOLIC BLOOD PRESSURE: 149 MMHG | HEIGHT: 63 IN | DIASTOLIC BLOOD PRESSURE: 64 MMHG | BODY MASS INDEX: 26.58 KG/M2 | TEMPERATURE: 98.2 F | OXYGEN SATURATION: 100 % | WEIGHT: 150 LBS | HEART RATE: 54 BPM

## 2025-02-03 DIAGNOSIS — K52.9 ENTERITIS: Primary | ICD-10-CM

## 2025-02-03 LAB
ABO GROUP (TYPE) IN BLOOD: NORMAL
ALBUMIN SERPL BCP-MCNC: 4.4 G/DL (ref 3.4–5)
ALP SERPL-CCNC: 140 U/L (ref 33–136)
ALT SERPL W P-5'-P-CCNC: 26 U/L (ref 7–45)
ANION GAP SERPL CALCULATED.3IONS-SCNC: 11 MMOL/L (ref 10–20)
ANTIBODY SCREEN: NORMAL
APPEARANCE UR: CLEAR
AST SERPL W P-5'-P-CCNC: 28 U/L (ref 9–39)
BASOPHILS # BLD AUTO: 0.05 X10*3/UL (ref 0–0.1)
BASOPHILS NFR BLD AUTO: 0.7 %
BILIRUB SERPL-MCNC: 0.8 MG/DL (ref 0–1.2)
BILIRUB UR STRIP.AUTO-MCNC: NEGATIVE MG/DL
BUN SERPL-MCNC: 13 MG/DL (ref 6–23)
CALCIUM SERPL-MCNC: 9.6 MG/DL (ref 8.6–10.3)
CHLORIDE SERPL-SCNC: 107 MMOL/L (ref 98–107)
CO2 SERPL-SCNC: 27 MMOL/L (ref 21–32)
COLOR UR: ABNORMAL
CREAT SERPL-MCNC: 0.72 MG/DL (ref 0.5–1.05)
EGFRCR SERPLBLD CKD-EPI 2021: 89 ML/MIN/1.73M*2
EOSINOPHIL # BLD AUTO: 0.03 X10*3/UL (ref 0–0.4)
EOSINOPHIL NFR BLD AUTO: 0.4 %
ERYTHROCYTE [DISTWIDTH] IN BLOOD BY AUTOMATED COUNT: 13.5 % (ref 11.5–14.5)
FLUAV RNA RESP QL NAA+PROBE: NOT DETECTED
FLUBV RNA RESP QL NAA+PROBE: NOT DETECTED
GLUCOSE SERPL-MCNC: 101 MG/DL (ref 74–99)
GLUCOSE UR STRIP.AUTO-MCNC: NORMAL MG/DL
HCT VFR BLD AUTO: 42.1 % (ref 36–46)
HGB BLD-MCNC: 14.1 G/DL (ref 12–16)
HOLD SPECIMEN: NORMAL
IMM GRANULOCYTES # BLD AUTO: 0.01 X10*3/UL (ref 0–0.5)
IMM GRANULOCYTES NFR BLD AUTO: 0.1 % (ref 0–0.9)
KETONES UR STRIP.AUTO-MCNC: NEGATIVE MG/DL
LACTATE SERPL-SCNC: 1 MMOL/L (ref 0.4–2)
LEUKOCYTE ESTERASE UR QL STRIP.AUTO: NEGATIVE
LYMPHOCYTES # BLD AUTO: 3.28 X10*3/UL (ref 0.8–3)
LYMPHOCYTES NFR BLD AUTO: 47.4 %
MAGNESIUM SERPL-MCNC: 1.99 MG/DL (ref 1.6–2.4)
MCH RBC QN AUTO: 31.7 PG (ref 26–34)
MCHC RBC AUTO-ENTMCNC: 33.5 G/DL (ref 32–36)
MCV RBC AUTO: 95 FL (ref 80–100)
MONOCYTES # BLD AUTO: 0.65 X10*3/UL (ref 0.05–0.8)
MONOCYTES NFR BLD AUTO: 9.4 %
NEUTROPHILS # BLD AUTO: 2.9 X10*3/UL (ref 1.6–5.5)
NEUTROPHILS NFR BLD AUTO: 42 %
NITRITE UR QL STRIP.AUTO: NEGATIVE
NRBC BLD-RTO: 0 /100 WBCS (ref 0–0)
PH UR STRIP.AUTO: 5.5 [PH]
PLATELET # BLD AUTO: 252 X10*3/UL (ref 150–450)
POTASSIUM SERPL-SCNC: 4 MMOL/L (ref 3.5–5.3)
PROT SERPL-MCNC: 7.5 G/DL (ref 6.4–8.2)
PROT UR STRIP.AUTO-MCNC: NEGATIVE MG/DL
RBC # BLD AUTO: 4.45 X10*6/UL (ref 4–5.2)
RBC # UR STRIP.AUTO: NEGATIVE /UL
RH FACTOR (ANTIGEN D): NORMAL
SARS-COV-2 RNA RESP QL NAA+PROBE: NOT DETECTED
SODIUM SERPL-SCNC: 141 MMOL/L (ref 136–145)
SP GR UR STRIP.AUTO: 1.05
UROBILINOGEN UR STRIP.AUTO-MCNC: NORMAL MG/DL
WBC # BLD AUTO: 6.9 X10*3/UL (ref 4.4–11.3)

## 2025-02-03 PROCEDURE — 74177 CT ABD & PELVIS W/CONTRAST: CPT | Performed by: STUDENT IN AN ORGANIZED HEALTH CARE EDUCATION/TRAINING PROGRAM

## 2025-02-03 PROCEDURE — 81003 URINALYSIS AUTO W/O SCOPE: CPT

## 2025-02-03 PROCEDURE — 87075 CULTR BACTERIA EXCEPT BLOOD: CPT | Mod: WESLAB,59

## 2025-02-03 PROCEDURE — 74177 CT ABD & PELVIS W/CONTRAST: CPT

## 2025-02-03 PROCEDURE — 83735 ASSAY OF MAGNESIUM: CPT

## 2025-02-03 PROCEDURE — 2550000001 HC RX 255 CONTRASTS: Performed by: EMERGENCY MEDICINE

## 2025-02-03 PROCEDURE — 87636 SARSCOV2 & INF A&B AMP PRB: CPT

## 2025-02-03 PROCEDURE — 80053 COMPREHEN METABOLIC PANEL: CPT

## 2025-02-03 PROCEDURE — 36415 COLL VENOUS BLD VENIPUNCTURE: CPT

## 2025-02-03 PROCEDURE — 83605 ASSAY OF LACTIC ACID: CPT

## 2025-02-03 PROCEDURE — 87040 BLOOD CULTURE FOR BACTERIA: CPT | Mod: WESLAB

## 2025-02-03 PROCEDURE — 86900 BLOOD TYPING SEROLOGIC ABO: CPT

## 2025-02-03 PROCEDURE — 99285 EMERGENCY DEPT VISIT HI MDM: CPT | Mod: 25 | Performed by: EMERGENCY MEDICINE

## 2025-02-03 PROCEDURE — 85025 COMPLETE CBC W/AUTO DIFF WBC: CPT

## 2025-02-03 RX ORDER — FAMOTIDINE 20 MG/1
20 TABLET, FILM COATED ORAL 2 TIMES DAILY
Qty: 30 TABLET | Refills: 0 | Status: SHIPPED | OUTPATIENT
Start: 2025-02-03 | End: 2025-02-18

## 2025-02-03 RX ORDER — ONDANSETRON 4 MG/1
4 TABLET, FILM COATED ORAL EVERY 6 HOURS
Qty: 12 TABLET | Refills: 0 | Status: SHIPPED | OUTPATIENT
Start: 2025-02-03 | End: 2025-02-06

## 2025-02-03 RX ORDER — ACETAMINOPHEN 325 MG/1
975 TABLET ORAL ONCE
Status: DISCONTINUED | OUTPATIENT
Start: 2025-02-03 | End: 2025-02-03 | Stop reason: HOSPADM

## 2025-02-03 RX ORDER — DICYCLOMINE HYDROCHLORIDE 20 MG/1
20 TABLET ORAL 2 TIMES DAILY
Qty: 20 TABLET | Refills: 0 | Status: SHIPPED | OUTPATIENT
Start: 2025-02-03 | End: 2025-02-13

## 2025-02-03 RX ADMIN — IOHEXOL 75 ML: 350 INJECTION, SOLUTION INTRAVENOUS at 11:43

## 2025-02-03 ASSESSMENT — LIFESTYLE VARIABLES
EVER HAD A DRINK FIRST THING IN THE MORNING TO STEADY YOUR NERVES TO GET RID OF A HANGOVER: NO
HAVE YOU EVER FELT YOU SHOULD CUT DOWN ON YOUR DRINKING: NO
TOTAL SCORE: 0
HAVE PEOPLE ANNOYED YOU BY CRITICIZING YOUR DRINKING: NO
EVER FELT BAD OR GUILTY ABOUT YOUR DRINKING: NO

## 2025-02-03 ASSESSMENT — PAIN - FUNCTIONAL ASSESSMENT
PAIN_FUNCTIONAL_ASSESSMENT: 0-10
PAIN_FUNCTIONAL_ASSESSMENT: 0-10

## 2025-02-03 ASSESSMENT — COLUMBIA-SUICIDE SEVERITY RATING SCALE - C-SSRS
1. IN THE PAST MONTH, HAVE YOU WISHED YOU WERE DEAD OR WISHED YOU COULD GO TO SLEEP AND NOT WAKE UP?: NO
6. HAVE YOU EVER DONE ANYTHING, STARTED TO DO ANYTHING, OR PREPARED TO DO ANYTHING TO END YOUR LIFE?: NO
2. HAVE YOU ACTUALLY HAD ANY THOUGHTS OF KILLING YOURSELF?: NO

## 2025-02-03 ASSESSMENT — PAIN SCALES - GENERAL
PAINLEVEL_OUTOF10: 6
PAINLEVEL_OUTOF10: 0 - NO PAIN

## 2025-02-03 NOTE — ED PROVIDER NOTES
HPI   Chief Complaint   Patient presents with    Black or Bloody Stool       HPI  Patient is a 74-year-old female presents ED for chief complaint of bloody stools.  Patient states she took a bowel movement and there was blood in the water.  She denies any history of GI bleeding.  Denies any blood thinner use.  Denies any NSAID use.  Denies any alcohol abuse.  Denies any abdominal pain, NVD.  No other acute complaints.      Patient History   Past Medical History:   Diagnosis Date    Atypical ductal hyperplasia, breast     left breast    Coronary artery disease      Past Surgical History:   Procedure Laterality Date    BREAST BIOPSY Left      SECTION, CLASSIC      HIP SURGERY       Family History   Problem Relation Name Age of Onset    Breast cancer Sister       Social History     Tobacco Use    Smoking status: Former     Current packs/day: 0.00     Types: Cigarettes     Quit date:      Years since quittin.1     Passive exposure: Never    Smokeless tobacco: Never   Vaping Use    Vaping status: Never Used   Substance Use Topics    Alcohol use: Not Currently     Alcohol/week: 1.0 standard drink of alcohol     Types: 1 Glasses of wine per week     Comment: Social    Drug use: Never       Physical Exam   ED Triage Vitals   Temperature Heart Rate Respirations BP   25 0936 25 0936 25 0936 25 0936   (!) 38.6 °C (101.5 °F) 61 18 (!) 113/96      Pulse Ox Temp Source Heart Rate Source Patient Position   25 0936 25 0936 25 0936 25 1252   98 % Oral Monitor Sitting      BP Location FiO2 (%)     25 1252 --     Left arm        Physical Exam  Vitals reviewed.   Constitutional:       General: She is not in acute distress.     Appearance: Normal appearance. She is not ill-appearing.   HENT:      Head: Normocephalic and atraumatic.   Eyes:      Extraocular Movements: Extraocular movements intact.   Cardiovascular:      Rate and Rhythm: Normal rate and regular rhythm.       Heart sounds: Normal heart sounds.   Pulmonary:      Effort: Pulmonary effort is normal.      Breath sounds: Normal breath sounds.   Abdominal:      General: Abdomen is flat.   Musculoskeletal:         General: Normal range of motion.      Cervical back: Normal range of motion and neck supple.   Skin:     General: Skin is warm and dry.   Neurological:      General: No focal deficit present.      Mental Status: She is alert and oriented to person, place, and time.   Psychiatric:         Mood and Affect: Mood normal.         Behavior: Behavior normal.           ED Course & MDM   Diagnoses as of 25 1708   Enteritis                 No data recorded                                 Medical Decision Making  Parts of this chart have been completed using voice recognition software. Please excuse any errors of transcription.  My thought process and reason for plan has been formulated from the time that I saw the patient until the time of disposition and is not specific to one specific moment during their visit and furthermore my MDM encompasses this entire chart and not only this text box.    HPI:   A medically appropriate HPI was obtained, outlined above.    Mckenna Hardin is a  74 y.o. female    Chief Complaint   Patient presents with    Black or Bloody Stool       Past Medical History:   Diagnosis Date    Atypical ductal hyperplasia, breast     left breast    Coronary artery disease        Past Surgical History:   Procedure Laterality Date    BREAST BIOPSY Left      SECTION, CLASSIC      HIP SURGERY         Social History     Tobacco Use    Smoking status: Former     Current packs/day: 0.00     Types: Cigarettes     Quit date:      Years since quittin.1     Passive exposure: Never    Smokeless tobacco: Never   Vaping Use    Vaping status: Never Used   Substance Use Topics    Alcohol use: Not Currently     Alcohol/week: 1.0 standard drink of alcohol     Types: 1 Glasses of wine per week      "Comment: Social    Drug use: Never       Family History   Problem Relation Name Age of Onset    Breast cancer Sister         Allergies   Allergen Reactions    Codeine Unknown     Unable to recall sideffect    Haloperidol Unknown     hyper    Thiazides Rash       Current Outpatient Medications   Medication Instructions    albuterol 90 mcg/actuation inhaler 1-2 puffs, inhalation, Every 4 hours PRN    b complex vitamins capsule 1 capsule, oral, Nightly    blood-glucose meter (OneTouch Ultra2 Meter) misc 1 kit, miscellaneous, Daily    cholecalciferol (VITAMIN D3) 1,000 Units, oral, Daily    dicyclomine (BENTYL) 20 mg, oral, 2 times daily    divalproex (DEPAKOTE ER) 500 mg, oral, Nightly    famotidine (PEPCID) 20 mg, oral, 2 times daily    fexofenadine (ALLEGRA) 180 mg, oral, Daily    fluticasone (Flonase Sensimist) 27.5 mcg/actuation nasal spray 1 spray, Each Nostril, Daily RT    fluticasone (Flonase) 50 mcg/actuation nasal spray 1 spray, Daily    levothyroxine (SYNTHROID, LEVOXYL) 25 mcg, oral, Daily    lidocaine 4 % patch 1 patch, transdermal, As needed, As directed externally    omega-3 fatty acids (FISH OIL CONCENTRATE ORAL) 1 capsule, Nightly    ondansetron (ZOFRAN) 4 mg, oral, Every 6 hours    OneTouch Delica Plus Lancet 33 gauge [facility]     OneTouch Ultra Test strip TEST ONCE DAILY    rosuvastatin (CRESTOR) 5 mg, oral, Daily    spironolactone (ALDACTONE) 50 mg, oral, Daily   for details    Exam:   Patient Vitals for the past 24 hrs:   BP Temp Temp src Pulse Resp SpO2 Height Weight   02/03/25 1252 149/64 -- -- 54 17 100 % -- --   02/03/25 1238 173/68 -- -- 51 16 98 % -- --   02/03/25 0945 -- 36.8 °C (98.2 °F) Oral -- -- -- -- --   02/03/25 0936 (!) 113/96 (!) 38.6 °C (101.5 °F) Oral 61 18 98 % 1.6 m (5' 3\") 68 kg (150 lb)       A medically appropriate exam performed, outlined above, given the known history and presentation.    EKG/Cardiac monitor:   If EKG was done and, it was interpreted by attending physician, see " their note for ED course for more detail.    Medications given during visit:  Medications   iohexol (OMNIPaque) 350 mg iodine/mL solution 75 mL (75 mL intravenous Given 2/3/25 1143)        Diagnostic/tests:  Labs Reviewed   CBC WITH AUTO DIFFERENTIAL - Abnormal       Result Value    WBC 6.9      nRBC 0.0      RBC 4.45      Hemoglobin 14.1      Hematocrit 42.1      MCV 95      MCH 31.7      MCHC 33.5      RDW 13.5      Platelets 252      Neutrophils % 42.0      Immature Granulocytes %, Automated 0.1      Lymphocytes % 47.4      Monocytes % 9.4      Eosinophils % 0.4      Basophils % 0.7      Neutrophils Absolute 2.90      Immature Granulocytes Absolute, Automated 0.01      Lymphocytes Absolute 3.28 (*)     Monocytes Absolute 0.65      Eosinophils Absolute 0.03      Basophils Absolute 0.05     COMPREHENSIVE METABOLIC PANEL - Abnormal    Glucose 101 (*)     Sodium 141      Potassium 4.0      Chloride 107      Bicarbonate 27      Anion Gap 11      Urea Nitrogen 13      Creatinine 0.72      eGFR 89      Calcium 9.6      Albumin 4.4      Alkaline Phosphatase 140 (*)     Total Protein 7.5      AST 28      Bilirubin, Total 0.8      ALT 26     URINALYSIS WITH REFLEX CULTURE AND MICROSCOPIC - Abnormal    Color, Urine Light-Yellow      Appearance, Urine Clear      Specific Gravity, Urine 1.049 (*)     pH, Urine 5.5      Protein, Urine NEGATIVE      Glucose, Urine Normal      Blood, Urine NEGATIVE      Ketones, Urine NEGATIVE      Bilirubin, Urine NEGATIVE      Urobilinogen, Urine Normal      Nitrite, Urine NEGATIVE      Leukocyte Esterase, Urine NEGATIVE     BLOOD CULTURE - Normal    Blood Culture Loaded on Instrument - Culture in progress     BLOOD CULTURE - Normal    Blood Culture Loaded on Instrument - Culture in progress     MAGNESIUM - Normal    Magnesium 1.99     LACTATE - Normal    Lactate 1.0      Narrative:     Venipuncture immediately after or during the administration of Metamizole may lead to falsely low results.  Testing should be performed immediately prior to Metamizole dosing.   SARS-COV-2 AND INFLUENZA A/B PCR - Normal    Flu A Result Not Detected      Flu B Result Not Detected      Coronavirus 2019, PCR Not Detected      Narrative:     This assay is an FDA-cleared, in vitro diagnostic nucleic acid amplification test for the qualitative detection and differentiation of SARS CoV-2/ Influenza A/B from nasopharyngeal specimens collected from individuals with signs and symptoms of respiratory tract infections, and has been validated for use at [facility]. Negative results do not preclude COVID-19/ Influenza A/B infections and should not be used as the sole basis for diagnosis, treatment, or other management decisions. Testing for SARS CoV-2 is recommended only for patients who meet current clinical and/or epidemiological criteria defined by federal, state, or local public health directives.   TYPE AND SCREEN    ABO TYPE A      Rh TYPE POS      ANTIBODY SCREEN NEG     URINALYSIS WITH REFLEX CULTURE AND MICROSCOPIC    Narrative:     The following orders were created for panel order Urinalysis with Reflex Culture and Microscopic.  Procedure                               Abnormality         Status                     ---------                               -----------         ------                     Urinalysis with Reflex C...[[id number]]  Abnormal            Final result               Extra Urine Gray Tube[[id number]]                            In process                   Please view results for these tests on the individual orders.   EXTRA URINE GRAY TUBE        CT abdomen pelvis w IV contrast   Final Result   Possible mild enteritis. No acute abdominopelvic process otherwise.   Consider outpatient colonoscopy given provided history.        MACRO:   None        Signed by: Kelsy Damian 2/3/2025 12:29 PM   Dictation workstation:   [id number]             [facility] Summary:  CT shows mild enteritis.   Patient feeling better after medications.  She is stable for discharge.    We have discussed the diagnosis and risks, and we agree with discharging home to follow-up with appropriate physician as directed. We also discussed returning to the Emergency Department immediately if new or worsening symptoms occur. We have discussed the symptoms which are most concerning that necessitate immediate return. Pt symptoms have been well controlled here and the patient is safe for discharge with appropriate outpatient follow up. The patient has verbalized understanding to return to ER without delay for new or worsening pains or for any other symptoms or concerns. I utilized the discharge clinical management tool provided [employer] to help estimate risk of negative outcome for this patient.      Disposition:  ED Prescriptions       Medication Sig Dispense Start Date End Date Auth. Provider    ondansetron (Zofran) 4 mg tablet Take 1 tablet (4 mg) by mouth every 6 hours for 3 days. 12 tablet 2/3/2025 2/6/2025 Kevan Borjas PA-C    dicyclomine (Bentyl) 20 mg tablet Take 1 tablet (20 mg) by mouth 2 times a day for 10 days. 20 tablet 2/3/2025 2/13/2025 Kevan Borjas PA-C    famotidine (Pepcid) 20 mg tablet Take 1 tablet (20 mg) by mouth 2 times a day for 15 days. 30 tablet 2/3/2025 2/18/2025 Kevan Borjas PA-C              Procedure  Procedures     Kevan Borjas PA-C  02/03/25 1748

## 2025-02-03 NOTE — ED TRIAGE NOTES
Pt states around Thursday she started noticing dark tarry stools. Pt denies being on blood thinners at this time. States she has some lower abd pain as well. Pt states she's slightly lightheaded. Denies sob denies chest pain

## 2025-02-03 NOTE — PROGRESS NOTES
Mckenna Hardin is a 74 y.o. female who is being seen in the Emergency Department for No Principal Problem currently listed. Pharmacy reviewed the patient's perol-eo-obbupgjbt medications and allergies for accuracy.    Medications ADDED:  None  Medications CHANGED:  Lidocaine patch 4%  Levothyroxine 25 mcg tablet  Fish oil concentrate capsule  Medications REMOVED:   spironolactone (Aldactone) 50 mg tablet   rosuvastatin (Crestor) 5 mg tablet  fluticasone (Flonase Sensimist) 27.5 mcg/actuation nasal spray  fexofenadine (Allegra) 180 mg tablet  Aspirin 81 mg chewable tablet  Elderberry fruit 350 mg capsule  Loratadine 10 mg tablet  Magnesium citrate 100 mg tablet  Milk thistle 175 mg tablet    The list below reflects the updated PTA list. Comments regarding how patient may be taking medications differently can be found in the Admit Orders Activity  Prior to Admission Medications   Prescriptions Last Dose Informant   albuterol 90 mcg/actuation inhaler PRN Self   Sig: Inhale 1-2 puffs every 4 hours if needed for wheezing.   b complex vitamins capsule 1/31/2025 Bedtime Self   Sig: Take 1 capsule by mouth once daily at bedtime.      cholecalciferol (Vitamin D3) 25 MCG (1000 UT) tablet 1/31/2025 Bedtime Self   Sig: Take 1 tablet (1,000 Units) by mouth once daily.   divalproex (Depakote ER) 500 mg 24 hr tablet 1/31/2025 Bedtime Self   Sig: Take 1 tablet (500 mg) by mouth once daily at bedtime.   fluticasone (Flonase) 50 mcg/actuation nasal spray Past Week Self   Sig: Administer 1 spray into each nostril once daily.   levothyroxine (Synthroid, Levoxyl) 25 mcg tablet 1/31/2025 Self   Sig: TAKE 1 TABLET (25 MCG) BY MOUTH ONCE DAILY AT BEDTIME   lidocaine 4 % patch PRN  Self   Sig: Place 1 patch on the skin if needed (pain). As directed   externally   omega-3 fatty acids (FISH OIL CONCENTRATE ORAL) Past Week Self   Sig: Take 1 capsule by mouth once daily at bedtime.      Facility-Administered Medications: None        The list  below reflects the updated allergy list. Please review each documented allergy for additional clarification and justification.  Allergies  Reviewed by Loy Balderas on 2/3/2025        Severity Reactions Comments    Codeine Not Specified Unknown Unable to recall sideffect    Haloperidol Not Specified Unknown hyper    Thiazides Low Rash             Pharmacy has been updated to [facility], [phone].    Sources used to complete the med history include:   Patient interviewed with visitor at bedside, dispense report, care everywhere, chart review history    Below are additional concerns with the patient's PTA list.  None    Loy Balderas CPhT  Please reach out via [facility] Secure Chat for questions

## 2025-02-04 ENCOUNTER — TELEPHONE (OUTPATIENT)
Dept: PRIMARY CARE | Facility: CLINIC | Age: 75
End: 2025-02-04
Payer: MEDICARE

## 2025-02-07 ENCOUNTER — APPOINTMENT (OUTPATIENT)
Dept: HEMATOLOGY/ONCOLOGY | Facility: CLINIC | Age: 75
End: 2025-02-07
Payer: MEDICARE

## 2025-02-07 ENCOUNTER — LAB (OUTPATIENT)
Dept: LAB | Facility: HOSPITAL | Age: 75
End: 2025-02-07
Payer: MEDICARE

## 2025-02-07 DIAGNOSIS — D50.0 IRON DEFICIENCY ANEMIA SECONDARY TO BLOOD LOSS (CHRONIC): Primary | ICD-10-CM

## 2025-02-07 LAB
ALBUMIN SERPL BCP-MCNC: 4 G/DL (ref 3.4–5)
ALP SERPL-CCNC: 132 U/L (ref 33–136)
ALT SERPL W P-5'-P-CCNC: 24 U/L (ref 7–45)
ANION GAP SERPL CALC-SCNC: 13 MMOL/L (ref 10–20)
AST SERPL W P-5'-P-CCNC: 28 U/L (ref 9–39)
BACTERIA BLD CULT: NORMAL
BACTERIA BLD CULT: NORMAL
BILIRUB SERPL-MCNC: 0.5 MG/DL (ref 0–1.2)
BUN SERPL-MCNC: 13 MG/DL (ref 6–23)
CALCIUM SERPL-MCNC: 9.5 MG/DL (ref 8.6–10.6)
CHLORIDE SERPL-SCNC: 106 MMOL/L (ref 98–107)
CO2 SERPL-SCNC: 27 MMOL/L (ref 21–32)
CREAT SERPL-MCNC: 0.7 MG/DL (ref 0.5–1.05)
EGFRCR SERPLBLD CKD-EPI 2021: >90 ML/MIN/1.73M*2
FERRITIN SERPL-MCNC: 73 NG/ML (ref 8–150)
GLUCOSE SERPL-MCNC: 91 MG/DL (ref 74–99)
IRON SATN MFR SERPL: 14 % (ref 25–45)
IRON SERPL-MCNC: 56 UG/DL (ref 35–150)
POTASSIUM SERPL-SCNC: 4.2 MMOL/L (ref 3.5–5.3)
PROT SERPL-MCNC: 6.2 G/DL (ref 6.4–8.2)
SODIUM SERPL-SCNC: 142 MMOL/L (ref 136–145)
TIBC SERPL-MCNC: 396 UG/DL (ref 240–445)
UIBC SERPL-MCNC: 340 UG/DL (ref 110–370)

## 2025-02-07 PROCEDURE — 83550 IRON BINDING TEST: CPT

## 2025-02-07 PROCEDURE — 83540 ASSAY OF IRON: CPT

## 2025-02-07 PROCEDURE — 80053 COMPREHEN METABOLIC PANEL: CPT

## 2025-02-07 PROCEDURE — 82728 ASSAY OF FERRITIN: CPT

## 2025-02-08 LAB — VALPROATE SERPL-MCNC: 50 MG/L (ref 50–100)

## 2025-02-11 ENCOUNTER — OFFICE VISIT (OUTPATIENT)
Dept: HEMATOLOGY/ONCOLOGY | Facility: CLINIC | Age: 75
End: 2025-02-11
Payer: MEDICARE

## 2025-02-11 VITALS
WEIGHT: 150.68 LBS | OXYGEN SATURATION: 99 % | DIASTOLIC BLOOD PRESSURE: 58 MMHG | BODY MASS INDEX: 26.69 KG/M2 | HEART RATE: 60 BPM | RESPIRATION RATE: 18 BRPM | TEMPERATURE: 96.4 F | SYSTOLIC BLOOD PRESSURE: 123 MMHG

## 2025-02-11 DIAGNOSIS — D50.0 IRON DEFICIENCY ANEMIA DUE TO CHRONIC BLOOD LOSS: ICD-10-CM

## 2025-02-11 PROCEDURE — 1160F RVW MEDS BY RX/DR IN RCRD: CPT | Performed by: INTERNAL MEDICINE

## 2025-02-11 PROCEDURE — 1159F MED LIST DOCD IN RCRD: CPT | Performed by: INTERNAL MEDICINE

## 2025-02-11 PROCEDURE — 99215 OFFICE O/P EST HI 40 MIN: CPT | Performed by: INTERNAL MEDICINE

## 2025-02-11 PROCEDURE — 1125F AMNT PAIN NOTED PAIN PRSNT: CPT | Performed by: INTERNAL MEDICINE

## 2025-02-11 PROCEDURE — 3078F DIAST BP <80 MM HG: CPT | Performed by: INTERNAL MEDICINE

## 2025-02-11 PROCEDURE — 3074F SYST BP LT 130 MM HG: CPT | Performed by: INTERNAL MEDICINE

## 2025-02-11 ASSESSMENT — PAIN SCALES - GENERAL: PAINLEVEL_OUTOF10: 3

## 2025-02-11 NOTE — PROGRESS NOTES
Patient ID: Mckenna Hardin is a 74 y.o. female.    Subjective: lymphocytosis    Had bloody diarrhea last week. Went to ER and CT AP showed enteritis. Resolved now    Heme/Onc History:  2024: 73-year-old woman with a history of bilateral lower extremity edema, coronary artery disease, with two stents, NSTEMI, nonalcoholic hepatosteatosis, primary hypertension, loss of balance, hypothyroidism, hyperglycemia, varicose veins, elevated alkaline phosphatase,bipolar disorder, referred for lymphocytosis.       Family History:     Family History   Problem Relation Name Age of Onset    Breast cancer Sister         Past Medical History  Past Medical History:   Diagnosis Date    Atypical ductal hyperplasia, breast     left breast    Coronary artery disease         Surgical history:   Past Surgical History:   Procedure Laterality Date    BREAST BIOPSY Left      SECTION, CLASSIC      HIP SURGERY        reports that she quit smoking about 35 years ago. Her smoking use included cigarettes. She has never been exposed to tobacco smoke. She has never used smokeless tobacco.    Review Of Systems:  As per in HPI, otherwise all other 12 point of ROS are negative.    Physical Exam:  /58 (BP Location: Right arm, Patient Position: Sitting, BP Cuff Size: Adult long)   Pulse 60   Temp 35.8 °C (96.4 °F) (Temporal)   Resp 18   Wt 68.3 kg (150 lb 11 oz) Comment: rufused to remove shoes nohelia aware  SpO2 99%   BMI 26.69 kg/m²   BSA: 1.74 meters squared  General: awake/alert/oriented x3, no distress, alert and cooperative  Head: Short hair fully covering scalp. Symmetric facial expressions  Eyes: PERRL, EOMI, clear sclera, eyebrows present.  Ears/Nose/Mouth/Throat:  Oral mucous membranes moist. No oral ulcers. No palpable pre/post-auricular lymph nodes  Neck: No palpable cervical chain lymph nodes  Respiratory: unlabored breathing on room air, good chest expansion, thorax symmetric  Cardio: Regular rate and rhythm, normal S1 and  S2, radial pulses symmetric  GI: Nondistended, soft, non-tender abdomen  Musculoskeletal: Normal muscle bulk and tone, ROM intact, no joint swelling.  Rises from chair and walks unassisted.  Extremities: No ankle swelling, no arm or leg wounds  Neuro: Alert, cognition intact, speech normal. Facial expressions symmetric.  No motor deficits noted. Sensation intact to touch and hot/cold.   Able to stand from seated position unassisted and walks around the room unassisted.  Psychological: Appropriate mood and behavior.  Skin: Warm and dry, no lesions, no rashes    Results:  Diagnostic Results   Lab Results   Component Value Date    WBC 6.9 02/03/2025    HGB 14.1 02/03/2025    HCT 42.1 02/03/2025    MCV 95 02/03/2025     02/03/2025     Lab Results   Component Value Date    CALCIUM 9.5 02/07/2025     02/07/2025    K 4.2 02/07/2025    CO2 27 02/07/2025     02/07/2025    BUN 13 02/07/2025    CREATININE 0.70 02/07/2025    ALT 24 02/07/2025    AST 28 02/07/2025     Orders Only on 02/07/2025   Component Date Value Ref Range Status    VALPROIC ACID 02/07/2025 50.0  50.0 - 100.0 mg/L  Final          Current Outpatient Medications:     albuterol 90 mcg/actuation inhaler, Inhale 1-2 puffs every 4 hours if needed for wheezing., Disp: 18 g, Rfl: 1    b complex vitamins capsule, Take 1 capsule by mouth once daily at bedtime., Disp: , Rfl:     blood-glucose meter (OneTouch Ultra2 Meter) misc, 1 kit once daily., Disp: 1 each, Rfl: 0    cholecalciferol (Vitamin D3) 25 MCG (1000 UT) tablet, Take 1 tablet (1,000 Units) by mouth once daily., Disp: , Rfl:     dicyclomine (Bentyl) 20 mg tablet, Take 1 tablet (20 mg) by mouth 2 times a day for 10 days., Disp: 20 tablet, Rfl: 0    divalproex (Depakote ER) 500 mg 24 hr tablet, Take 1 tablet (500 mg) by mouth once daily at bedtime., Disp: , Rfl:     famotidine (Pepcid) 20 mg tablet, Take 1 tablet (20 mg) by mouth 2 times a day for 15 days., Disp: 30 tablet, Rfl: 0     fexofenadine (Allegra) 180 mg tablet, Take 1 tablet (180 mg) by mouth once daily. (Patient not taking: Reported on 2/3/2025), Disp: 30 tablet, Rfl: 5    fluticasone (Flonase Sensimist) 27.5 mcg/actuation nasal spray, Administer 1 spray into each nostril once daily. (Patient not taking: Reported on 2/3/2025), Disp: 10 g, Rfl: 5    fluticasone (Flonase) 50 mcg/actuation nasal spray, Administer 1 spray into each nostril once daily., Disp: , Rfl:     levothyroxine (Synthroid, Levoxyl) 25 mcg tablet, TAKE 1 TABLET (25 MCG) BY MOUTH EARLY IN THE MORNING.. (Patient taking differently: Take 1 tablet (25 mcg) by mouth once daily at bedtime.), Disp: 90 tablet, Rfl: 0    lidocaine 4 % patch, Place 1 patch on the skin if needed (pain). As directed externally, Disp: , Rfl:     omega-3 fatty acids (FISH OIL CONCENTRATE ORAL), Take 1 capsule by mouth once daily at bedtime., Disp: , Rfl:     OneTouch Delica Plus Lancet 33 gauge misc, , Disp: , Rfl:     OneTouch Ultra Test strip, TEST ONCE DAILY, Disp: 100 strip, Rfl: 0    rosuvastatin (Crestor) 5 mg tablet, Take 1 tablet (5 mg) by mouth once daily. (Patient not taking: Reported on 2/3/2025), Disp: 100 tablet, Rfl: 3    spironolactone (Aldactone) 50 mg tablet, TAKE 1 TABLET BY MOUTH EVERY DAY (Patient not taking: Reported on 2/3/2025), Disp: 30 tablet, Rfl: 0     Radiology:    Pathology:    Assessment/Plan:    1- Lymphocytosis and ertythrocytosis:    In 02/24, patient was referred for lymphocytosis. There is no evidence of iron def or any cytopenias. But her hbg was above 15. She is a never smoker. Does snore but declines DAMIAN.    Ddx: B cell monoclonal lymphocytosis or CLL. Flow cytometry is negative    Myeloid NGS is undetected , Epo level is 12.    HFE mut test ordered for high H/H work up which showed heterozygous H63D mut which explains her high H/H and iron overload. Phlebotomy ordered. Instructed to start taking ASA 81 mg every day.    02/11/25: s/p 1 phlebotomy. Iron levels  are WNL, in fact iron sat is %14. No need for more phlebotomy at this time.    RTC in 6 months with labs    Diagnoses and all orders for this visit:  Iron deficiency anemia due to chronic blood loss  -     Clinic Appointment Request       Performance Status: Asymptomatic    I spent more than 30 minutes for the patient today, including face-to-face conversation, pre-visit preparation, post-visit orders, and others.   Layla Arguelles MD

## 2025-02-11 NOTE — PROGRESS NOTES
Pt seen in office today for a follow up visit with Dr. Layla Arguelles for management of her lymphocytosis.  She is without complaints today and denies pain.     Medications, pharmacy preference and allergies were reviewed with patient and updated in the medical record by MD.     Per orders, labs were obtained on 2/7/25 and a CT c/a/p was completed on 2/3/25. Results are to be reviewed in visit today by MD with patient.She is to RTC in 6 months with labs p rior to her visit    Our contact information was given to patient and they were encouraged to contact us with any questions or concerns.     Patient verbalized understanding and agreement regarding discussed information via verbal feedback. Pt escorted to scheduling.

## 2025-02-18 ENCOUNTER — APPOINTMENT (OUTPATIENT)
Dept: PRIMARY CARE | Facility: CLINIC | Age: 75
End: 2025-02-18
Payer: COMMERCIAL

## 2025-02-18 VITALS
BODY MASS INDEX: 26.93 KG/M2 | RESPIRATION RATE: 22 BRPM | OXYGEN SATURATION: 98 % | SYSTOLIC BLOOD PRESSURE: 135 MMHG | DIASTOLIC BLOOD PRESSURE: 80 MMHG | HEIGHT: 63 IN | WEIGHT: 152 LBS | HEART RATE: 60 BPM

## 2025-02-18 DIAGNOSIS — Z00.00 ROUTINE GENERAL MEDICAL EXAMINATION AT HEALTH CARE FACILITY: ICD-10-CM

## 2025-02-18 DIAGNOSIS — I21.4 NSTEMI (NON-ST ELEVATED MYOCARDIAL INFARCTION) (MULTI): ICD-10-CM

## 2025-02-18 DIAGNOSIS — K92.1 HEMATOCHEZIA: ICD-10-CM

## 2025-02-18 DIAGNOSIS — I25.118 CORONARY ARTERY DISEASE INVOLVING NATIVE CORONARY ARTERY OF NATIVE HEART WITH OTHER FORM OF ANGINA PECTORIS: ICD-10-CM

## 2025-02-18 DIAGNOSIS — I10 PRIMARY HYPERTENSION: ICD-10-CM

## 2025-02-18 DIAGNOSIS — K52.9 GASTROENTERITIS: ICD-10-CM

## 2025-02-18 DIAGNOSIS — Z00.00 MEDICARE ANNUAL WELLNESS VISIT, SUBSEQUENT: Primary | ICD-10-CM

## 2025-02-18 DIAGNOSIS — E03.9 ACQUIRED HYPOTHYROIDISM: ICD-10-CM

## 2025-02-18 DIAGNOSIS — E78.00 HYPERCHOLESTEROLEMIA: ICD-10-CM

## 2025-02-18 DIAGNOSIS — K76.0 NONALCOHOLIC HEPATOSTEATOSIS: ICD-10-CM

## 2025-02-18 PROBLEM — R60.0 BILATERAL LOWER EXTREMITY EDEMA: Status: RESOLVED | Noted: 2022-05-16 | Resolved: 2025-02-18

## 2025-02-18 PROCEDURE — 1160F RVW MEDS BY RX/DR IN RCRD: CPT | Performed by: INTERNAL MEDICINE

## 2025-02-18 PROCEDURE — 1036F TOBACCO NON-USER: CPT | Performed by: INTERNAL MEDICINE

## 2025-02-18 PROCEDURE — 3008F BODY MASS INDEX DOCD: CPT | Performed by: INTERNAL MEDICINE

## 2025-02-18 PROCEDURE — 1170F FXNL STATUS ASSESSED: CPT | Performed by: INTERNAL MEDICINE

## 2025-02-18 PROCEDURE — G0439 PPPS, SUBSEQ VISIT: HCPCS | Performed by: INTERNAL MEDICINE

## 2025-02-18 PROCEDURE — 3079F DIAST BP 80-89 MM HG: CPT | Performed by: INTERNAL MEDICINE

## 2025-02-18 PROCEDURE — 3075F SYST BP GE 130 - 139MM HG: CPT | Performed by: INTERNAL MEDICINE

## 2025-02-18 PROCEDURE — 99214 OFFICE O/P EST MOD 30 MIN: CPT | Performed by: INTERNAL MEDICINE

## 2025-02-18 PROCEDURE — 1159F MED LIST DOCD IN RCRD: CPT | Performed by: INTERNAL MEDICINE

## 2025-02-18 ASSESSMENT — ACTIVITIES OF DAILY LIVING (ADL)
TAKING_MEDICATION: INDEPENDENT
DOING_HOUSEWORK: INDEPENDENT
BATHING: INDEPENDENT
GROCERY_SHOPPING: INDEPENDENT
MANAGING_FINANCES: INDEPENDENT
DRESSING: INDEPENDENT

## 2025-02-18 ASSESSMENT — PATIENT HEALTH QUESTIONNAIRE - PHQ9
SUM OF ALL RESPONSES TO PHQ9 QUESTIONS 1 AND 2: 0
2. FEELING DOWN, DEPRESSED OR HOPELESS: NOT AT ALL
1. LITTLE INTEREST OR PLEASURE IN DOING THINGS: NOT AT ALL

## 2025-02-18 ASSESSMENT — ENCOUNTER SYMPTOMS
CONSTITUTIONAL NEGATIVE: 1
DEPRESSION: 0
CARDIOVASCULAR NEGATIVE: 1
NEUROLOGICAL NEGATIVE: 1
ALLERGIC/IMMUNOLOGIC NEGATIVE: 1
PSYCHIATRIC NEGATIVE: 1
ENDOCRINE NEGATIVE: 1
EYES NEGATIVE: 1
HEMATOLOGIC/LYMPHATIC NEGATIVE: 1
RESPIRATORY NEGATIVE: 1
OCCASIONAL FEELINGS OF UNSTEADINESS: 0
GASTROINTESTINAL NEGATIVE: 1
LOSS OF SENSATION IN FEET: 0
MUSCULOSKELETAL NEGATIVE: 1

## 2025-02-18 NOTE — ASSESSMENT & PLAN NOTE
Monitor liver function and educate diet Educate extensively low carbohydrate diet AND LOW FAT DIET AND increase in exercise activity doubt the diagnosis of hemochromatosis follow with GI / Hepatology

## 2025-02-18 NOTE — PROGRESS NOTES
"Subjective   Reason for Visit: Mckenna Hardin is an 74 y.o. female here for a Medicare Wellness visit. Went to ER 2 weeks ago on February 3rd 2025  due to viral infection associated with loose stools with blood in stool   73-year-old woman with a history of bilateral lower extremity edema, coronary artery disease, with two stents, NSTEMI, nonalcoholic hepatosteatosis, primary hypertension, loss of balance, hypothyroidism, hyperglycemia, varicose veins, elevated alkaline phosphatase,bipolar disorder, referred for lymphocytosis.        Reviewed all medications by prescribing practitioner or clinical pharmacist (such as prescriptions, OTCs, herbal therapies and supplements) and documented in the medical record.    HPI    Patient Care Team:  Yogesh Martines MD as PCP - General (Internal Medicine)  Yogesh Martines MD as PCP - [facility] [employer] PCP  Calin Hurtado MD as Consulting Physician (Hematology and Oncology)  Layla Arguelles MD as Medical Oncologist (Hematology and Oncology)     Review of Systems   Constitutional: Negative.    HENT: Negative.     Eyes: Negative.    Respiratory: Negative.     Cardiovascular: Negative.    Gastrointestinal: Negative.    Endocrine: Negative.    Musculoskeletal: Negative.    Skin: Negative.    Allergic/Immunologic: Negative.    Neurological: Negative.    Hematological: Negative.    Psychiatric/Behavioral: Negative.     All other systems reviewed and are negative.      Objective   Vitals:  /80   Pulse 60   Resp 22   Ht 1.6 m (5' 3\")   Wt 68.9 kg (152 lb)   SpO2 98%   BMI 26.93 kg/m²       Physical Exam  Vitals and nursing note reviewed.   Constitutional:       Appearance: Normal appearance.   HENT:      Head: Normocephalic and atraumatic.      Right Ear: Tympanic membrane, ear canal and external ear normal.      Left Ear: Tympanic membrane, ear canal and external ear normal. There is no impacted cerumen.      Nose: Nose normal.      Mouth/Throat: "      Mouth: Mucous membranes are moist.      Pharynx: Oropharynx is clear.   Eyes:      Extraocular Movements: Extraocular movements intact.      Conjunctiva/sclera: Conjunctivae normal.      Pupils: Pupils are equal, round, and reactive to light.   Cardiovascular:      Rate and Rhythm: Normal rate and regular rhythm.      Pulses: Normal pulses.      Heart sounds: Normal heart sounds. No murmur heard.  Pulmonary:      Effort: Pulmonary effort is normal. No respiratory distress.      Breath sounds: Normal breath sounds. No stridor. No wheezing, rhonchi or rales.   Chest:      Chest wall: No tenderness.   Abdominal:      General: Abdomen is flat. Bowel sounds are normal. There is no distension.      Palpations: Abdomen is soft. There is no mass.      Tenderness: There is no abdominal tenderness. There is no right CVA tenderness, left CVA tenderness, guarding or rebound.      Hernia: No hernia is present.   Musculoskeletal:         General: Normal range of motion.      Cervical back: Normal range of motion and neck supple.   Skin:     General: Skin is warm.      Capillary Refill: Capillary refill takes less than 2 seconds.   Neurological:      General: No focal deficit present.      Mental Status: She is alert.      Cranial Nerves: No cranial nerve deficit.      Sensory: No sensory deficit.      Motor: No weakness.      Coordination: Coordination normal.      Gait: Gait normal.      Deep Tendon Reflexes: Reflexes normal.   Psychiatric:         Mood and Affect: Mood normal.         Behavior: Behavior normal. Behavior is cooperative.         Thought Content: Thought content normal.         Judgment: Judgment normal.         Assessment & Plan  Routine general medical examination at health care facility    Orders:    1 Year Follow Up In Primary Care - Wellness Exam; Future    Hematochezia    Orders:    Colonoscopy Screening; High Risk Patient; blood in stool; Future    Coronary artery disease involving native coronary  artery of native heart with other form of angina pectoris  CAD-coronary artery disease-patient has a history of myocardial infarction/stent placed in stenosed coronary arteries. Target LDL should be below 70 milligrams per deciliter. Reviewed EKG which shows no significant changes. Follows with his cardiologist/  ???He needs to call us with any new symptoms of angina or shortness of breath. Last stress test was/last coronary catheterization was/. Need to address risk factors BioCore controlling cholesterol blood pressure and diabetes. Educate extensively diet. Patient needs to follow in rehabilitation/mild exercises daily.          Hypercholesterolemia  Hypercholesterolemia - Monitor lipid profile and educate patient upon risks of high cholesterol and targets. Educate diet and change in lifestyle and increase in exercises - Refill:   and educate compliance with medication and diet.           Primary hypertension  HTN - hypertension well/controlled .Target BP < 130/80 achieved. Educate low salt diet and exercise with weight loss. Educate home self monitoring and diary keeping. Educate risks of elevate blood pressure and benefits of prompt treatment. Refill the Spironolactone          NSTEMI (non-ST elevated myocardial infarction) (Multi)  CAD-coronary artery disease-patient has a history of myocardial infarction/stent placed in stenosed coronary arteries. Target LDL should be below 70 milligrams per deciliter. Reviewed EKG which shows no significant changes. Follows with his cardiologist/ Dr. Kenrick Giordano. He needs to call us with any new symptoms of angina or shortness of breath. Last stress test was/last coronary catheterization was/. Need to address risk factors BioCore controlling cholesterol blood pressure and diabetes. Educate extensively diet. Patient needs to follow in rehabilitation/mild exercises daily.          Acquired hypothyroidism  Hypothyroidism - Symptoms not well/controlled (weight gain, fatigue,  constipation, cold intolerance). Check TSH re-start low dose of Synthroid brand of 25 mcg/qD.          Gastroenteritis  Associated with hematochezia and refer to colonoscopy and possibly due to Cirrhosis associated with steato-hepatitis or NOWAK          Nonalcoholic hepatosteatosis  Monitor liver function and educate diet Educate extensively low carbohydrate diet AND LOW FAT DIET AND increase in exercise activity doubt the diagnosis of hemochromatosis follow with GI / Hepatology          Medicare annual wellness visit, subsequent  No recent hospitalizations.     All medications reviewed and reconciled by me the provider..  No use of controlled substances or opiates.     Family history, social history reviewed, no changes.     Patient does not smoke.     Patient does not drink.     Patient hydrates adequately daily.  Eats a well-balanced healthy diet.      Exercises adequately including walking and doing weightbearing exercises.     Patient denies any difficulty with memory or cognition.      Denies any difficulty with hearing.  Patient does not wear hearing aids.     No fall risk.  No recent falls.  Denies any difficulty walking.     Patient with no history of depression anxiety, denies any loss of interest, no feeling of sadness, no lack of motivation.     Patient is independent in all ADLs and IADLs.  Independent bathing, dressing, walking.  Takes care of own finances, shopping and cooking.      End-of-life decision-making power of  reviewed with patient.       Risk Factors Identified During Visit: None.   Influenza: influenza vaccine was previously given.   Pneumovax 23: Pneumovax 23 vaccine was previously given.   Prevnar 13: Prevnar 13 vaccine was previously given.   Shingles Vaccine: Shingles vaccine was previously given.   Colorectal Cancer Screening: screening is current.   Abdominal Aortic Aneurysm screening: screening is current.   HIV screening: screening not indicated.        Preventive measures  - Recommend ASAP : PAP/Mamogram and refer patient to GYN. Specialist. Refer to Colonoscopy (educate patient risks of colon cancer) refer patient to GI specialist. Ophthalmology and retina exam recommend yearly exams refer patient to an Ophthalmologist                Cardiac Risk Assessment  15 - 20 minutes were spent discussing Cardiovascular risk and, if needed, lifestyle modifications were recommended, including nutritional choices, exercise, and elimination of habits contributing to risk.   Aspirin use/disuse was discussed following the guidelines below:  low dose ASA ( mg) should be considered:    If prior Heart Attack/Stroke/Peripheral vascular disease:  Generally recommend daily low dose aspirin unless extremely high bleeding risk (e.g., gastrointestinal).    If no prior Heart Attack/Stroke/Peripheral vascular disease:              Age over 70: Do not use Aspirin for prevention    Age less than 70 and 10-year cardiovascular disease risk is >20%: use low dose Aspirin for prevention.                 Primary hypertension - Primary I10        HTN - hypertension well/controlled .Target BP < 130/80 achieved. Educate low salt diet and exercise with weight loss. Educate home self monitoring and diary keeping. Educate risks of elevate blood pressure and benefits of prompt treatment. Refill the Spironolactone            Acquired hypothyroidism E03.9       Hypothyroidism - Symptoms not well/controlled (weight gain, fatigue, constipation, cold intolerance). Check TSH re-start low dose of Synthroid brand of 25 mcg/qD.

## 2025-02-18 NOTE — ASSESSMENT & PLAN NOTE
Hypercholesterolemia - Monitor lipid profile and educate patient upon risks of high cholesterol and targets. Educate diet and change in lifestyle and increase in exercises - Refill:   and educate compliance with medication and diet.

## 2025-02-18 NOTE — ASSESSMENT & PLAN NOTE
No recent hospitalizations.     All medications reviewed and reconciled by me the provider..  No use of controlled substances or opiates.     Family history, social history reviewed, no changes.     Patient does not smoke.     Patient does not drink.     Patient hydrates adequately daily.  Eats a well-balanced healthy diet.      Exercises adequately including walking and doing weightbearing exercises.     Patient denies any difficulty with memory or cognition.      Denies any difficulty with hearing.  Patient does not wear hearing aids.     No fall risk.  No recent falls.  Denies any difficulty walking.     Patient with no history of depression anxiety, denies any loss of interest, no feeling of sadness, no lack of motivation.     Patient is independent in all ADLs and IADLs.  Independent bathing, dressing, walking.  Takes care of own finances, shopping and cooking.      End-of-life decision-making power of  reviewed with patient.       Risk Factors Identified During Visit: None.   Influenza: influenza vaccine was previously given.   Pneumovax 23: Pneumovax 23 vaccine was previously given.   Prevnar 13: Prevnar 13 vaccine was previously given.   Shingles Vaccine: Shingles vaccine was previously given.   Colorectal Cancer Screening: screening is current.   Abdominal Aortic Aneurysm screening: screening is current.   HIV screening: screening not indicated.        Preventive measures - Recommend ASAP : PAP/Mamogram and refer patient to GYN. Specialist. Refer to Colonoscopy (educate patient risks of colon cancer) refer patient to GI specialist. Ophthalmology and retina exam recommend yearly exams refer patient to an Ophthalmologist

## 2025-02-18 NOTE — ASSESSMENT & PLAN NOTE
Associated with hematochezia and refer to colonoscopy and possibly due to Cirrhosis associated with steato-hepatitis or NOWAK

## 2025-03-26 ENCOUNTER — APPOINTMENT (OUTPATIENT)
Dept: URGENT CARE | Age: 75
End: 2025-03-26
Payer: MEDICARE

## 2025-04-29 DIAGNOSIS — E03.9 ACQUIRED HYPOTHYROIDISM: ICD-10-CM

## 2025-04-29 RX ORDER — LEVOTHYROXINE SODIUM 25 UG/1
25 TABLET ORAL DAILY
Qty: 90 TABLET | Refills: 0 | Status: SHIPPED | OUTPATIENT
Start: 2025-04-29

## 2025-05-13 DIAGNOSIS — Z79.899 PHARMACOLOGIC THERAPY: Primary | ICD-10-CM

## 2025-05-19 ENCOUNTER — TELEPHONE (OUTPATIENT)
Dept: HEMATOLOGY/ONCOLOGY | Facility: CLINIC | Age: 75
End: 2025-05-19
Payer: MEDICARE

## 2025-05-19 NOTE — TELEPHONE ENCOUNTER
This RN returned patient call and discussed Hemochromatosis diagnosis. Patient does carry gene mutation but not enough to express heavy disease per Dr. Layla Arguelles. Verbal education provided. Patient able to provide teach back with not further questions at this time. Review next appointment with Dr. Layla Arguelles in August 2025 with labs drawn a few days before. No barriers at this time.

## 2025-06-03 ENCOUNTER — TELEPHONE (OUTPATIENT)
Dept: PRIMARY CARE | Facility: CLINIC | Age: 75
End: 2025-06-03
Payer: MEDICARE

## 2025-06-20 ENCOUNTER — TELEPHONE (OUTPATIENT)
Dept: PRIMARY CARE | Facility: CLINIC | Age: 75
End: 2025-06-20

## 2025-07-24 ENCOUNTER — TELEPHONE (OUTPATIENT)
Dept: PRIMARY CARE | Facility: CLINIC | Age: 75
End: 2025-07-24
Payer: MEDICARE

## 2025-07-25 LAB
ALBUMIN SERPL-MCNC: 4.2 G/DL (ref 3.6–5.1)
ALP SERPL-CCNC: 177 U/L (ref 37–153)
ALT SERPL-CCNC: 28 U/L (ref 6–29)
AMMONIA PLAS-SCNC: 55 UMOL/L
ANION GAP SERPL CALCULATED.4IONS-SCNC: 9 MMOL/L (CALC) (ref 7–17)
AST SERPL-CCNC: 33 U/L (ref 10–35)
BILIRUB SERPL-MCNC: 0.6 MG/DL (ref 0.2–1.2)
BUN SERPL-MCNC: 12 MG/DL (ref 7–25)
CALCIUM SERPL-MCNC: 9.7 MG/DL (ref 8.6–10.4)
CHLORIDE SERPL-SCNC: 105 MMOL/L (ref 98–110)
CO2 SERPL-SCNC: 26 MMOL/L (ref 20–32)
CREAT SERPL-MCNC: 0.67 MG/DL (ref 0.6–1)
EGFRCR SERPLBLD CKD-EPI 2021: 92 ML/MIN/1.73M2
GLUCOSE SERPL-MCNC: 63 MG/DL (ref 65–99)
POTASSIUM SERPL-SCNC: 4.1 MMOL/L (ref 3.5–5.3)
PROT SERPL-MCNC: 6.9 G/DL (ref 6.1–8.1)
SODIUM SERPL-SCNC: 140 MMOL/L (ref 135–146)
VALPROATE SERPL-MCNC: 58 MG/L (ref 50–100)

## 2025-08-06 ENCOUNTER — APPOINTMENT (OUTPATIENT)
Dept: PRIMARY CARE | Facility: CLINIC | Age: 75
End: 2025-08-06
Payer: MEDICARE

## 2025-08-06 VITALS
HEART RATE: 67 BPM | SYSTOLIC BLOOD PRESSURE: 167 MMHG | DIASTOLIC BLOOD PRESSURE: 64 MMHG | BODY MASS INDEX: 27.06 KG/M2 | WEIGHT: 152.78 LBS | OXYGEN SATURATION: 95 %

## 2025-08-06 DIAGNOSIS — I10 PRIMARY HYPERTENSION: ICD-10-CM

## 2025-08-06 DIAGNOSIS — E03.9 ACQUIRED HYPOTHYROIDISM: ICD-10-CM

## 2025-08-06 DIAGNOSIS — Z00.00 HEALTHCARE MAINTENANCE: ICD-10-CM

## 2025-08-06 DIAGNOSIS — M81.0 AGE-RELATED OSTEOPOROSIS WITHOUT CURRENT PATHOLOGICAL FRACTURE: ICD-10-CM

## 2025-08-06 DIAGNOSIS — Z12.31 ENCOUNTER FOR SCREENING MAMMOGRAM FOR MALIGNANT NEOPLASM OF BREAST: ICD-10-CM

## 2025-08-06 DIAGNOSIS — S00.06XA TICK BITE OF SCALP, INITIAL ENCOUNTER: Primary | ICD-10-CM
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
## 2025-08-06 DIAGNOSIS — Z12.11 COLON CANCER SCREENING: ICD-10-CM

## 2025-08-06 PROCEDURE — 3077F SYST BP >= 140 MM HG: CPT

## 2025-08-06 PROCEDURE — 3078F DIAST BP <80 MM HG: CPT

## 2025-08-06 PROCEDURE — 1159F MED LIST DOCD IN RCRD: CPT

## 2025-08-06 PROCEDURE — 99214 OFFICE O/P EST MOD 30 MIN: CPT

## 2025-08-06 RX ORDER — LEVOTHYROXINE SODIUM 25 UG/1
25 TABLET ORAL DAILY
Qty: 90 TABLET | Refills: 0 | Status: CANCELLED | OUTPATIENT
Start: 2025-08-06

## 2025-08-06 NOTE — ASSESSMENT & PLAN NOTE
- BP was high in the office today, 167/64. Will monitor, advised to check BP at home and cut down salts form diet.   - also ordered TSH.   - Currently she is on 25 MCG levothyroxine for hypothyroidism.  - She was on spironolactone 50 mg daily for blood pressure, but stopped taking it, noted that she has an appointment with her cardiologist soon and he will decide whether she should be on antihypertensive.     Orders:    Tsh With Reflex To Free T4 If Abnormal; Future

## 2025-08-06 NOTE — PROGRESS NOTES
Subjective   Patient ID: Mckenna Hardin is a 75 y.o. female who presents for Annual Exam (physical).    HPI   Mckenna Hardin is a 75 y.o. female who presents for Annual Exam (physical).  Had a tic bite  a week ago,on her scalp, had sore ness but didn't do anything immediately. She pulled something by tweezers today morning she is not sure it's tic /scab? initially she has some soreness but not now, no fever, chill, malaise body ache, joint pain after bite. She has Chronic lymphedema.  She denies any other complaints.    Due for mammogram, colonoscopy and dexa scan.  Due for vaccines.     Review of Systems   Constitutional:         Take bite in the right temporal area of the scalp   All other systems reviewed and are negative.      Objective   /64   Pulse 67   Wt 69.3 kg (152 lb 12.5 oz)   SpO2 95%   BMI 27.06 kg/m²     Physical Exam  Constitutional:       General: She is not in acute distress.     Appearance: Normal appearance. She is normal weight. She is not ill-appearing, toxic-appearing or diaphoretic.   HENT:      Head: Normocephalic and atraumatic.      Right Ear: Tympanic membrane, ear canal and external ear normal. There is no impacted cerumen.      Left Ear: Tympanic membrane, ear canal and external ear normal. There is no impacted cerumen.      Nose: Nose normal. No congestion or rhinorrhea.      Mouth/Throat:      Mouth: Mucous membranes are moist.      Pharynx: Oropharynx is clear. No oropharyngeal exudate or posterior oropharyngeal erythema.     Eyes:      General: No scleral icterus.        Right eye: No discharge.         Left eye: No discharge.      Extraocular Movements: Extraocular movements intact.      Conjunctiva/sclera: Conjunctivae normal.      Pupils: Pupils are equal, round, and reactive to light.     Neck:      Vascular: No carotid bruit.     Cardiovascular:      Rate and Rhythm: Normal rate and regular rhythm.      Pulses: Normal pulses.      Heart sounds: Normal heart sounds. No  murmur heard.     No friction rub. No gallop.   Pulmonary:      Effort: Pulmonary effort is normal. No respiratory distress.      Breath sounds: Normal breath sounds. No stridor. No wheezing, rhonchi or rales.   Chest:      Chest wall: No tenderness.   Abdominal:      General: Abdomen is flat. Bowel sounds are normal. There is no distension.      Palpations: Abdomen is soft.      Tenderness: There is no abdominal tenderness. There is no guarding or rebound.     Musculoskeletal:         General: No swelling, tenderness, deformity or signs of injury.      Cervical back: Normal range of motion and neck supple. No rigidity or tenderness.      Right lower leg: Edema present.      Left lower leg: Edema present.   Lymphadenopathy:      Cervical: No cervical adenopathy.     Skin:     General: Skin is warm and dry.      Coloration: Skin is not jaundiced or pale.      Findings: No bruising, erythema, lesion or rash.      Comments: Sign of tick bite in the right temporal area of the scalp with a black himanshu in the center.  No tick notified.      Neurological:      General: No focal deficit present.      Mental Status: She is alert and oriented to person, place, and time. Mental status is at baseline.      Cranial Nerves: No cranial nerve deficit.      Sensory: No sensory deficit.      Motor: No weakness.      Gait: Gait normal.     Psychiatric:         Mood and Affect: Mood normal.         Behavior: Behavior normal.         Thought Content: Thought content normal.         Judgment: Judgment normal.         Assessment/Plan   Assessment & Plan  Tick bite of scalp, initial encounter  - Had a tic bite  a week ago,on her scalp, had sore ness but didn't do anything immediately. She pulled something by tweezers today morning she is not sure it's tic /scab? initially she has some soreness but not now, no fever, chill, malaise body ache, joint pain after bite.   - Ordered Lyme serology.  Orders:    LYME (B. BURGDORFERI) AB MODIFIED  2-TITER TESTING, WITH REFLEX TO IGM AND IGG BY ANA; Future    Primary hypertension  - BP was high in the office today, 167/64. Will monitor, advised to check BP at home and cut down salts form diet.   - also ordered TSH.   - Currently she is on 25 MCG levothyroxine for hypothyroidism.  - She was on spironolactone 50 mg daily for blood pressure, but stopped taking it, noted that she has an appointment with her cardiologist soon and he will decide whether she should be on antihypertensive.     Orders:    Tsh With Reflex To Free T4 If Abnormal; Future    Encounter for screening mammogram for malignant neoplasm of breast    Orders:    BI mammo bilateral screening tomosynthesis; Future    Age-related osteoporosis without current pathological fracture    Orders:    XR DEXA bone density; Future    Colon cancer screening    Orders:    Cologuard® colon cancer screening; Future    Healthcare maintenance  - Due for DEXA scan, colonoscopy and mammogram.  - She wanted to try Cologuard instead of colonoscopy, ordered today.  -Due for vaccines does not want to take it now,      -RTC in 6 months                 This note is created using speech recognition transcription software. Despite proofreading, several typographical errors might be present that might affect the meaning of the content. Please call with any questions.

## 2025-08-07 ENCOUNTER — TELEPHONE (OUTPATIENT)
Dept: PRIMARY CARE | Facility: CLINIC | Age: 75
End: 2025-08-07
Payer: MEDICARE

## 2025-08-10 LAB — B BURGDOR IGG+IGM SER QL IA: <=0.9 INDEX

## 2025-08-11 ENCOUNTER — APPOINTMENT (OUTPATIENT)
Dept: HEMATOLOGY/ONCOLOGY | Facility: CLINIC | Age: 75
End: 2025-08-11
Payer: MEDICARE

## 2025-08-11 ENCOUNTER — LAB (OUTPATIENT)
Dept: LAB | Facility: HOSPITAL | Age: 75
End: 2025-08-11
Payer: MEDICARE

## 2025-08-11 ENCOUNTER — TELEPHONE (OUTPATIENT)
Dept: PRIMARY CARE | Facility: CLINIC | Age: 75
End: 2025-08-11

## 2025-08-11 DIAGNOSIS — D50.0 IRON DEFICIENCY ANEMIA DUE TO CHRONIC BLOOD LOSS: ICD-10-CM

## 2025-08-11 DIAGNOSIS — D50.0 IRON DEFICIENCY ANEMIA SECONDARY TO BLOOD LOSS (CHRONIC): Primary | ICD-10-CM

## 2025-08-11 LAB
ALBUMIN SERPL BCP-MCNC: 4.2 G/DL (ref 3.4–5)
ALP SERPL-CCNC: 168 U/L (ref 33–136)
ALT SERPL W P-5'-P-CCNC: 32 U/L (ref 7–45)
ANION GAP SERPL CALC-SCNC: 12 MMOL/L (ref 10–20)
AST SERPL W P-5'-P-CCNC: 34 U/L (ref 9–39)
BASOPHILS # BLD AUTO: 0.02 X10*3/UL (ref 0–0.1)
BASOPHILS NFR BLD AUTO: 0.3 %
BILIRUB SERPL-MCNC: 0.7 MG/DL (ref 0–1.2)
BUN SERPL-MCNC: 17 MG/DL (ref 6–23)
CALCIUM SERPL-MCNC: 9.9 MG/DL (ref 8.6–10.3)
CHLORIDE SERPL-SCNC: 106 MMOL/L (ref 98–107)
CO2 SERPL-SCNC: 28 MMOL/L (ref 21–32)
CREAT SERPL-MCNC: 0.72 MG/DL (ref 0.5–1.05)
EGFRCR SERPLBLD CKD-EPI 2021: 88 ML/MIN/1.73M*2
EOSINOPHIL # BLD AUTO: 0.04 X10*3/UL (ref 0–0.4)
EOSINOPHIL NFR BLD AUTO: 0.6 %
ERYTHROCYTE [DISTWIDTH] IN BLOOD BY AUTOMATED COUNT: 14.9 % (ref 11.5–14.5)
FERRITIN SERPL-MCNC: 32 NG/ML (ref 8–150)
GLUCOSE SERPL-MCNC: 109 MG/DL (ref 74–99)
HCT VFR BLD AUTO: 42.8 % (ref 36–46)
HGB BLD-MCNC: 13.9 G/DL (ref 12–16)
IMM GRANULOCYTES # BLD AUTO: 0.01 X10*3/UL (ref 0–0.5)
IMM GRANULOCYTES NFR BLD AUTO: 0.1 % (ref 0–0.9)
IRON SATN MFR SERPL: 22 % (ref 25–45)
IRON SERPL-MCNC: 119 UG/DL (ref 35–150)
LYMPHOCYTES # BLD AUTO: 2.82 X10*3/UL (ref 0.8–3)
LYMPHOCYTES NFR BLD AUTO: 41.5 %
MCH RBC QN AUTO: 29.1 PG (ref 26–34)
MCHC RBC AUTO-ENTMCNC: 32.5 G/DL (ref 32–36)
MCV RBC AUTO: 90 FL (ref 80–100)
MONOCYTES # BLD AUTO: 0.85 X10*3/UL (ref 0.05–0.8)
MONOCYTES NFR BLD AUTO: 12.5 %
NEUTROPHILS # BLD AUTO: 3.06 X10*3/UL (ref 1.6–5.5)
NEUTROPHILS NFR BLD AUTO: 45 %
NRBC BLD-RTO: ABNORMAL /100{WBCS}
PLATELET # BLD AUTO: 257 X10*3/UL (ref 150–450)
POTASSIUM SERPL-SCNC: 4.8 MMOL/L (ref 3.5–5.3)
PROT SERPL-MCNC: 6.5 G/DL (ref 6.4–8.2)
RBC # BLD AUTO: 4.78 X10*6/UL (ref 4–5.2)
SODIUM SERPL-SCNC: 141 MMOL/L (ref 136–145)
TIBC SERPL-MCNC: 537 UG/DL (ref 240–445)
UIBC SERPL-MCNC: 418 UG/DL (ref 110–370)
VIT B12 SERPL-MCNC: 455 PG/ML (ref 211–911)
WBC # BLD AUTO: 6.8 X10*3/UL (ref 4.4–11.3)

## 2025-08-11 PROCEDURE — 82607 VITAMIN B-12: CPT

## 2025-08-11 PROCEDURE — 80053 COMPREHEN METABOLIC PANEL: CPT

## 2025-08-11 PROCEDURE — 85025 COMPLETE CBC W/AUTO DIFF WBC: CPT

## 2025-08-11 PROCEDURE — 83540 ASSAY OF IRON: CPT

## 2025-08-11 PROCEDURE — 36415 COLL VENOUS BLD VENIPUNCTURE: CPT

## 2025-08-11 PROCEDURE — 83550 IRON BINDING TEST: CPT

## 2025-08-11 PROCEDURE — 82728 ASSAY OF FERRITIN: CPT

## 2025-08-12 ENCOUNTER — TELEPHONE (OUTPATIENT)
Dept: PRIMARY CARE | Facility: CLINIC | Age: 75
End: 2025-08-12
Payer: MEDICARE

## 2025-08-19 DIAGNOSIS — E03.9 ACQUIRED HYPOTHYROIDISM: ICD-10-CM

## 2025-08-19 RX ORDER — LEVOTHYROXINE SODIUM 25 UG/1
25 TABLET ORAL DAILY
Qty: 90 TABLET | Refills: 0 | Status: SHIPPED | OUTPATIENT
Start: 2025-08-19

## 2025-08-25 ENCOUNTER — OFFICE VISIT (OUTPATIENT)
Dept: HEMATOLOGY/ONCOLOGY | Facility: CLINIC | Age: 75
End: 2025-08-25
Payer: MEDICARE

## 2025-08-25 VITALS
HEART RATE: 54 BPM | WEIGHT: 154.2 LBS | DIASTOLIC BLOOD PRESSURE: 67 MMHG | TEMPERATURE: 96.4 F | SYSTOLIC BLOOD PRESSURE: 153 MMHG | RESPIRATION RATE: 18 BRPM | OXYGEN SATURATION: 96 % | BODY MASS INDEX: 27.32 KG/M2

## 2025-08-25 DIAGNOSIS — D50.0 IRON DEFICIENCY ANEMIA DUE TO CHRONIC BLOOD LOSS: ICD-10-CM

## 2025-08-25 PROCEDURE — 1126F AMNT PAIN NOTED NONE PRSNT: CPT | Performed by: INTERNAL MEDICINE

## 2025-08-25 PROCEDURE — 3078F DIAST BP <80 MM HG: CPT | Performed by: INTERNAL MEDICINE

## 2025-08-25 PROCEDURE — 99214 OFFICE O/P EST MOD 30 MIN: CPT | Performed by: INTERNAL MEDICINE

## 2025-08-25 PROCEDURE — 3077F SYST BP >= 140 MM HG: CPT | Performed by: INTERNAL MEDICINE

## 2025-08-25 ASSESSMENT — PAIN SCALES - GENERAL: PAINLEVEL_OUTOF10: 0-NO PAIN

## 2026-02-06 ENCOUNTER — APPOINTMENT (OUTPATIENT)
Dept: PRIMARY CARE | Facility: CLINIC | Age: 76
End: 2026-02-06
Payer: MEDICARE